# Patient Record
Sex: FEMALE | Race: WHITE | NOT HISPANIC OR LATINO | Employment: OTHER | RURAL
[De-identification: names, ages, dates, MRNs, and addresses within clinical notes are randomized per-mention and may not be internally consistent; named-entity substitution may affect disease eponyms.]

---

## 2019-12-02 ENCOUNTER — HISTORICAL (OUTPATIENT)
Dept: ADMINISTRATIVE | Facility: HOSPITAL | Age: 79
End: 2019-12-02

## 2019-12-06 LAB
LAB AP CLINICAL INFORMATION: NORMAL
LAB AP DIAGNOSIS - HISTORICAL: NORMAL
LAB AP GROSS PATHOLOGY - HISTORICAL: NORMAL
LAB AP SPECIMEN SUBMITTED - HISTORICAL: NORMAL

## 2021-12-02 ENCOUNTER — OFFICE VISIT (OUTPATIENT)
Dept: FAMILY MEDICINE | Facility: CLINIC | Age: 81
End: 2021-12-02
Payer: MEDICARE

## 2021-12-02 VITALS
TEMPERATURE: 97 F | OXYGEN SATURATION: 97 % | RESPIRATION RATE: 16 BRPM | HEIGHT: 64 IN | SYSTOLIC BLOOD PRESSURE: 147 MMHG | WEIGHT: 143.81 LBS | HEART RATE: 97 BPM | DIASTOLIC BLOOD PRESSURE: 72 MMHG | BODY MASS INDEX: 24.55 KG/M2

## 2021-12-02 DIAGNOSIS — F33.1 MODERATE EPISODE OF RECURRENT MAJOR DEPRESSIVE DISORDER: ICD-10-CM

## 2021-12-02 DIAGNOSIS — Z79.899 ENCOUNTER FOR LONG-TERM (CURRENT) USE OF OTHER MEDICATIONS: ICD-10-CM

## 2021-12-02 DIAGNOSIS — E55.9 VITAMIN D DEFICIENCY: ICD-10-CM

## 2021-12-02 DIAGNOSIS — Z95.0 PACEMAKER: ICD-10-CM

## 2021-12-02 DIAGNOSIS — E03.9 ACQUIRED HYPOTHYROIDISM: ICD-10-CM

## 2021-12-02 DIAGNOSIS — D53.9 NUTRITIONAL ANEMIA: ICD-10-CM

## 2021-12-02 DIAGNOSIS — H35.3230 BILATERAL EXUDATIVE AGE-RELATED MACULAR DEGENERATION, UNSPECIFIED STAGE: ICD-10-CM

## 2021-12-02 DIAGNOSIS — M15.9 PRIMARY OSTEOARTHRITIS INVOLVING MULTIPLE JOINTS: ICD-10-CM

## 2021-12-02 DIAGNOSIS — I10 ESSENTIAL HYPERTENSION, BENIGN: ICD-10-CM

## 2021-12-02 DIAGNOSIS — R35.1 NOCTURIA: ICD-10-CM

## 2021-12-02 DIAGNOSIS — G20.A1 PARKINSON'S DISEASE: Primary | ICD-10-CM

## 2021-12-02 PROBLEM — M15.0 PRIMARY OSTEOARTHRITIS INVOLVING MULTIPLE JOINTS: Status: ACTIVE | Noted: 2021-12-02

## 2021-12-02 LAB
25(OH)D3 SERPL-MCNC: 25.4 NG/ML
ALBUMIN SERPL BCP-MCNC: 3.8 G/DL (ref 3.5–5)
ALBUMIN/GLOB SERPL: 0.9 {RATIO}
ALP SERPL-CCNC: 65 U/L (ref 55–142)
ALT SERPL W P-5'-P-CCNC: 18 U/L (ref 13–56)
ANION GAP SERPL CALCULATED.3IONS-SCNC: 8 MMOL/L (ref 7–16)
AST SERPL W P-5'-P-CCNC: 17 U/L (ref 15–37)
BASOPHILS # BLD AUTO: 0.08 K/UL (ref 0–0.2)
BASOPHILS NFR BLD AUTO: 0.9 % (ref 0–1)
BILIRUB SERPL-MCNC: 0.3 MG/DL (ref 0–1.2)
BILIRUB UR QL STRIP: NEGATIVE
BUN SERPL-MCNC: 22 MG/DL (ref 7–18)
BUN/CREAT SERPL: 23 (ref 6–20)
CALCIUM SERPL-MCNC: 8.9 MG/DL (ref 8.5–10.1)
CHLORIDE SERPL-SCNC: 105 MMOL/L (ref 98–107)
CLARITY UR: CLEAR
CO2 SERPL-SCNC: 30 MMOL/L (ref 21–32)
COLOR UR: YELLOW
CREAT SERPL-MCNC: 0.97 MG/DL (ref 0.55–1.02)
DIFFERENTIAL METHOD BLD: ABNORMAL
EOSINOPHIL # BLD AUTO: 0.21 K/UL (ref 0–0.5)
EOSINOPHIL NFR BLD AUTO: 2.5 % (ref 1–4)
ERYTHROCYTE [DISTWIDTH] IN BLOOD BY AUTOMATED COUNT: 12.7 % (ref 11.5–14.5)
FOLATE SERPL-MCNC: >20 NG/ML (ref 3.1–17.5)
GLOBULIN SER-MCNC: 4.2 G/DL (ref 2–4)
GLUCOSE SERPL-MCNC: 84 MG/DL (ref 74–106)
GLUCOSE UR STRIP-MCNC: NEGATIVE MG/DL
HCT VFR BLD AUTO: 43.4 % (ref 38–47)
HGB BLD-MCNC: 14.2 G/DL (ref 12–16)
IMM GRANULOCYTES # BLD AUTO: 0.02 K/UL (ref 0–0.04)
IMM GRANULOCYTES NFR BLD: 0.2 % (ref 0–0.4)
KETONES UR STRIP-SCNC: NEGATIVE MG/DL
LEUKOCYTE ESTERASE UR QL STRIP: NEGATIVE
LYMPHOCYTES # BLD AUTO: 2.25 K/UL (ref 1–4.8)
LYMPHOCYTES NFR BLD AUTO: 26.3 % (ref 27–41)
MCH RBC QN AUTO: 28.7 PG (ref 27–31)
MCHC RBC AUTO-ENTMCNC: 32.7 G/DL (ref 32–36)
MCV RBC AUTO: 87.7 FL (ref 80–96)
MONOCYTES # BLD AUTO: 0.84 K/UL (ref 0–0.8)
MONOCYTES NFR BLD AUTO: 9.8 % (ref 2–6)
MPC BLD CALC-MCNC: 11 FL (ref 9.4–12.4)
NEUTROPHILS # BLD AUTO: 5.17 K/UL (ref 1.8–7.7)
NEUTROPHILS NFR BLD AUTO: 60.3 % (ref 53–65)
NITRITE UR QL STRIP: NEGATIVE
NRBC # BLD AUTO: 0 X10E3/UL
NRBC, AUTO (.00): 0 %
PH UR STRIP: 5.5 PH UNITS
PLATELET # BLD AUTO: 294 K/UL (ref 150–400)
POTASSIUM SERPL-SCNC: 4.3 MMOL/L (ref 3.5–5.1)
PROT SERPL-MCNC: 8 G/DL (ref 6.4–8.2)
PROT UR QL STRIP: NEGATIVE
RBC # BLD AUTO: 4.95 M/UL (ref 4.2–5.4)
RBC # UR STRIP: NEGATIVE /UL
SODIUM SERPL-SCNC: 139 MMOL/L (ref 136–145)
SP GR UR STRIP: 1.02
T4 FREE SERPL-MCNC: 1.29 NG/DL (ref 0.76–1.46)
TSH SERPL DL<=0.005 MIU/L-ACNC: 2.28 UIU/ML (ref 0.36–3.74)
UROBILINOGEN UR STRIP-ACNC: 0.2 MG/DL
VIT B12 SERPL-MCNC: 1738 PG/ML (ref 193–986)
WBC # BLD AUTO: 8.57 K/UL (ref 4.5–11)

## 2021-12-02 PROCEDURE — 81003 URINALYSIS: ICD-10-PCS | Mod: QW,,, | Performed by: CLINICAL MEDICAL LABORATORY

## 2021-12-02 PROCEDURE — 82746 ASSAY OF FOLIC ACID SERUM: CPT | Mod: ,,, | Performed by: CLINICAL MEDICAL LABORATORY

## 2021-12-02 PROCEDURE — 85025 COMPLETE CBC W/AUTO DIFF WBC: CPT | Mod: ,,, | Performed by: CLINICAL MEDICAL LABORATORY

## 2021-12-02 PROCEDURE — 82746 VITAMIN B12/FOLATE, SERUM PANEL: ICD-10-PCS | Mod: ,,, | Performed by: CLINICAL MEDICAL LABORATORY

## 2021-12-02 PROCEDURE — 81003 URINALYSIS AUTO W/O SCOPE: CPT | Mod: QW,,, | Performed by: CLINICAL MEDICAL LABORATORY

## 2021-12-02 PROCEDURE — 84439 T4, FREE: ICD-10-PCS | Mod: ,,, | Performed by: CLINICAL MEDICAL LABORATORY

## 2021-12-02 PROCEDURE — 99204 OFFICE O/P NEW MOD 45 MIN: CPT | Mod: ,,, | Performed by: FAMILY MEDICINE

## 2021-12-02 PROCEDURE — 85025 CBC WITH DIFFERENTIAL: ICD-10-PCS | Mod: ,,, | Performed by: CLINICAL MEDICAL LABORATORY

## 2021-12-02 PROCEDURE — 84439 ASSAY OF FREE THYROXINE: CPT | Mod: ,,, | Performed by: CLINICAL MEDICAL LABORATORY

## 2021-12-02 PROCEDURE — 80053 COMPREHENSIVE METABOLIC PANEL: ICD-10-PCS | Mod: ,,, | Performed by: CLINICAL MEDICAL LABORATORY

## 2021-12-02 PROCEDURE — 80053 COMPREHEN METABOLIC PANEL: CPT | Mod: ,,, | Performed by: CLINICAL MEDICAL LABORATORY

## 2021-12-02 PROCEDURE — 84443 ASSAY THYROID STIM HORMONE: CPT | Mod: ,,, | Performed by: CLINICAL MEDICAL LABORATORY

## 2021-12-02 PROCEDURE — 99204 PR OFFICE/OUTPT VISIT, NEW, LEVL IV, 45-59 MIN: ICD-10-PCS | Mod: ,,, | Performed by: FAMILY MEDICINE

## 2021-12-02 PROCEDURE — 82607 VITAMIN B-12: CPT | Mod: ,,, | Performed by: CLINICAL MEDICAL LABORATORY

## 2021-12-02 PROCEDURE — 82306 VITAMIN D 25 HYDROXY: CPT | Mod: ,,, | Performed by: CLINICAL MEDICAL LABORATORY

## 2021-12-02 PROCEDURE — 82607 VITAMIN B12/FOLATE, SERUM PANEL: ICD-10-PCS | Mod: ,,, | Performed by: CLINICAL MEDICAL LABORATORY

## 2021-12-02 PROCEDURE — 82306 VITAMIN D: ICD-10-PCS | Mod: ,,, | Performed by: CLINICAL MEDICAL LABORATORY

## 2021-12-02 PROCEDURE — 84443 TSH: ICD-10-PCS | Mod: ,,, | Performed by: CLINICAL MEDICAL LABORATORY

## 2021-12-02 RX ORDER — LURASIDONE HYDROCHLORIDE 20 MG/1
TABLET, FILM COATED ORAL
COMMUNITY
End: 2022-09-01

## 2021-12-02 RX ORDER — CARVEDILOL 12.5 MG/1
TABLET ORAL
COMMUNITY
Start: 2021-10-21

## 2021-12-02 RX ORDER — OXYBUTYNIN CHLORIDE 5 MG/1
5 TABLET ORAL NIGHTLY PRN
Qty: 30 TABLET | Refills: 0 | Status: SHIPPED | OUTPATIENT
Start: 2021-12-02 | End: 2022-01-21

## 2021-12-02 RX ORDER — CHOLECALCIFEROL (VITAMIN D3) 1MM UNIT/G
LIQUID (ML) MISCELLANEOUS
COMMUNITY

## 2021-12-02 RX ORDER — ROSUVASTATIN CALCIUM 5 MG/1
TABLET, COATED ORAL
COMMUNITY
Start: 2021-10-20

## 2021-12-02 RX ORDER — LISINOPRIL 5 MG/1
TABLET ORAL
COMMUNITY
Start: 2021-10-20

## 2021-12-02 RX ORDER — CARBIDOPA AND LEVODOPA 25; 100 MG/1; MG/1
TABLET ORAL
COMMUNITY
Start: 2021-07-09 | End: 2021-12-02 | Stop reason: SDUPTHER

## 2021-12-02 RX ORDER — MIRTAZAPINE 30 MG/1
TABLET, FILM COATED ORAL
COMMUNITY
Start: 2021-11-10 | End: 2022-01-05 | Stop reason: SDUPTHER

## 2021-12-02 RX ORDER — LEVOTHYROXINE SODIUM 100 UG/1
TABLET ORAL
COMMUNITY
Start: 2021-11-15 | End: 2022-02-10 | Stop reason: SDUPTHER

## 2021-12-02 RX ORDER — DIVALPROEX SODIUM 125 MG/1
TABLET, DELAYED RELEASE ORAL
COMMUNITY
Start: 2021-11-17

## 2021-12-02 RX ORDER — BIOTIN 10 MG
TABLET ORAL
COMMUNITY

## 2021-12-02 RX ORDER — FOLIC ACID 1 MG/1
TABLET ORAL
COMMUNITY
Start: 2021-11-09

## 2021-12-02 RX ORDER — DESVENLAFAXINE SUCCINATE 50 MG/1
TABLET, EXTENDED RELEASE ORAL
COMMUNITY
Start: 2021-11-22

## 2021-12-02 RX ORDER — CYANOCOBALAMIN 1000 UG/ML
INJECTION, SOLUTION INTRAMUSCULAR; SUBCUTANEOUS
COMMUNITY
Start: 2021-11-15

## 2021-12-02 RX ORDER — CLONAZEPAM 0.5 MG/1
TABLET ORAL
COMMUNITY
Start: 2021-10-14 | End: 2022-06-02 | Stop reason: SDUPTHER

## 2021-12-02 RX ORDER — CARBIDOPA AND LEVODOPA 25; 100 MG/1; MG/1
1 TABLET ORAL 3 TIMES DAILY
Qty: 90 TABLET | Refills: 0 | Status: SHIPPED | OUTPATIENT
Start: 2021-12-02 | End: 2021-12-22 | Stop reason: SDUPTHER

## 2021-12-02 RX ORDER — ASPIRIN 81 MG/1
TABLET ORAL
COMMUNITY

## 2021-12-08 ENCOUNTER — TELEPHONE (OUTPATIENT)
Dept: FAMILY MEDICINE | Facility: CLINIC | Age: 81
End: 2021-12-08
Payer: MEDICARE

## 2021-12-08 RX ORDER — TOLTERODINE 4 MG/1
4 CAPSULE, EXTENDED RELEASE ORAL DAILY
Qty: 30 CAPSULE | Refills: 0 | Status: SHIPPED | OUTPATIENT
Start: 2021-12-08 | End: 2022-01-21

## 2021-12-22 RX ORDER — CARBIDOPA AND LEVODOPA 25; 100 MG/1; MG/1
2 TABLET ORAL 3 TIMES DAILY
Qty: 180 TABLET | Refills: 0 | Status: SHIPPED | OUTPATIENT
Start: 2021-12-22 | End: 2022-01-21 | Stop reason: SDUPTHER

## 2022-01-05 RX ORDER — MIRTAZAPINE 30 MG/1
30 TABLET, FILM COATED ORAL NIGHTLY
Qty: 90 TABLET | Refills: 3 | Status: SHIPPED | OUTPATIENT
Start: 2022-01-05 | End: 2022-12-29

## 2022-01-20 ENCOUNTER — OFFICE VISIT (OUTPATIENT)
Dept: FAMILY MEDICINE | Facility: CLINIC | Age: 82
End: 2022-01-20
Payer: MEDICARE

## 2022-01-20 VITALS
OXYGEN SATURATION: 97 % | SYSTOLIC BLOOD PRESSURE: 120 MMHG | HEIGHT: 64 IN | HEART RATE: 67 BPM | WEIGHT: 143 LBS | BODY MASS INDEX: 24.41 KG/M2 | TEMPERATURE: 97 F | RESPIRATION RATE: 16 BRPM | DIASTOLIC BLOOD PRESSURE: 70 MMHG

## 2022-01-20 DIAGNOSIS — I10 ESSENTIAL HYPERTENSION, BENIGN: ICD-10-CM

## 2022-01-20 DIAGNOSIS — Z79.899 ENCOUNTER FOR LONG-TERM (CURRENT) USE OF OTHER MEDICATIONS: ICD-10-CM

## 2022-01-20 DIAGNOSIS — D53.9 NUTRITIONAL ANEMIA: ICD-10-CM

## 2022-01-20 DIAGNOSIS — R20.0 NUMBNESS AND TINGLING OF BOTH FEET: Primary | ICD-10-CM

## 2022-01-20 DIAGNOSIS — E03.9 ACQUIRED HYPOTHYROIDISM: ICD-10-CM

## 2022-01-20 DIAGNOSIS — R20.2 NUMBNESS AND TINGLING OF BOTH FEET: Primary | ICD-10-CM

## 2022-01-20 DIAGNOSIS — G20.A1 PARKINSON'S DISEASE: ICD-10-CM

## 2022-01-20 PROCEDURE — 99213 PR OFFICE/OUTPT VISIT, EST, LEVL III, 20-29 MIN: ICD-10-PCS | Mod: ,,, | Performed by: FAMILY MEDICINE

## 2022-01-20 PROCEDURE — 99213 OFFICE O/P EST LOW 20 MIN: CPT | Mod: ,,, | Performed by: FAMILY MEDICINE

## 2022-01-20 NOTE — PROGRESS NOTES
Subjective:       Patient ID: Cinthia Abraham is a 81 y.o. female.    Chief Complaint: Foot Pain (Both feet feel tingly,started 3 weeks ago.) and Medication Refill (Needs refill on her sinemet.)    3 weeks of feet numbness, can change with position.  No new meds.  Reviewed last labs.  Just saw her neurologist last Saturday, mentioned the numbness but it was not addressed.    Review of Systems   Constitutional: Negative for appetite change, chills, fatigue, fever and unexpected weight change.   Respiratory: Negative for cough and shortness of breath.    Cardiovascular: Negative for chest pain and leg swelling.   Gastrointestinal: Negative for abdominal pain.   Musculoskeletal: Negative for arthralgias.   Integumentary:  Negative for rash.   Neurological: Positive for numbness. Negative for weakness.   Psychiatric/Behavioral: The patient is not nervous/anxious.          Objective:      Physical Exam  Constitutional:       General: She is not in acute distress.     Appearance: Normal appearance.   Cardiovascular:      Rate and Rhythm: Normal rate and regular rhythm.      Pulses: Normal pulses.      Heart sounds: Normal heart sounds.      Comments: DP pulses 2+ bilat  Pulmonary:      Breath sounds: Normal breath sounds.   Abdominal:      General: Abdomen is flat.      Palpations: Abdomen is soft.   Skin:     General: Skin is warm and dry.   Neurological:      Mental Status: She is alert. Mental status is at baseline.      Comments: Reports decreased sensation to touch from toes to ankles bilat   Psychiatric:         Mood and Affect: Mood normal.         Behavior: Behavior normal.         Thought Content: Thought content normal.         Judgment: Judgment normal.         Assessment:       1. Numbness and tingling of both feet  Comprehensive Metabolic Panel    CBC Auto Differential    Hemoglobin A1C    Magnesium    Sedimentation Rate    T4, Free    TSH    Vitamin B12    CANCELED: Comprehensive Metabolic Panel    CANCELED:  CBC Auto Differential    CANCELED: Hemoglobin A1C    CANCELED: Magnesium    CANCELED: Sedimentation Rate    CANCELED: T4, Free    CANCELED: TSH    CANCELED: Vitamin B12   2. Parkinson's disease     3. Essential hypertension, benign  Comprehensive Metabolic Panel    Magnesium    CANCELED: Comprehensive Metabolic Panel    CANCELED: Magnesium   4. Acquired hypothyroidism  CBC Auto Differential    T4, Free    TSH    CANCELED: CBC Auto Differential    CANCELED: T4, Free    CANCELED: TSH   5. Encounter for long-term (current) use of other medications  Comprehensive Metabolic Panel    CBC Auto Differential    Hemoglobin A1C    Magnesium    Sedimentation Rate    Vitamin B12    CANCELED: Comprehensive Metabolic Panel    CANCELED: CBC Auto Differential    CANCELED: Hemoglobin A1C    CANCELED: Magnesium    CANCELED: Sedimentation Rate    CANCELED: Vitamin B12   6. Nutritional anemia  CBC Auto Differential    Vitamin B12    CANCELED: CBC Auto Differential    CANCELED: Vitamin B12       Plan:       PT for Parkinsons  Was going to repeat some labs but pt declined.  myrbetriq 25mg samples for her OAB  She declines NCS/EMG at this time.

## 2022-01-21 RX ORDER — CARBIDOPA AND LEVODOPA 25; 100 MG/1; MG/1
2 TABLET ORAL 3 TIMES DAILY
Qty: 540 TABLET | Refills: 3 | Status: SHIPPED | OUTPATIENT
Start: 2022-01-21 | End: 2023-01-27

## 2022-02-10 RX ORDER — LEVOTHYROXINE SODIUM 100 UG/1
100 TABLET ORAL
Qty: 90 TABLET | Refills: 3 | Status: SHIPPED | OUTPATIENT
Start: 2022-02-10 | End: 2023-02-06

## 2022-03-11 DIAGNOSIS — Z71.89 COMPLEX CARE COORDINATION: ICD-10-CM

## 2022-05-25 ENCOUNTER — TELEPHONE (OUTPATIENT)
Dept: FAMILY MEDICINE | Facility: CLINIC | Age: 82
End: 2022-05-25
Payer: MEDICARE

## 2022-06-02 ENCOUNTER — OFFICE VISIT (OUTPATIENT)
Dept: FAMILY MEDICINE | Facility: CLINIC | Age: 82
End: 2022-06-02
Payer: MEDICARE

## 2022-06-02 VITALS
HEIGHT: 64 IN | SYSTOLIC BLOOD PRESSURE: 120 MMHG | BODY MASS INDEX: 26.29 KG/M2 | OXYGEN SATURATION: 94 % | WEIGHT: 154 LBS | TEMPERATURE: 98 F | DIASTOLIC BLOOD PRESSURE: 70 MMHG | HEART RATE: 70 BPM | RESPIRATION RATE: 16 BRPM

## 2022-06-02 DIAGNOSIS — F51.01 PRIMARY INSOMNIA: ICD-10-CM

## 2022-06-02 DIAGNOSIS — F41.9 ANXIETY: Primary | ICD-10-CM

## 2022-06-02 DIAGNOSIS — Z79.899 ENCOUNTER FOR LONG-TERM (CURRENT) USE OF OTHER MEDICATIONS: ICD-10-CM

## 2022-06-02 LAB
CTP QC/QA: YES
POC (AMP) AMPHETAMINE: NEGATIVE
POC (BAR) BARBITURATES: NEGATIVE
POC (BUP) BUPRENORPHINE: NEGATIVE
POC (BZO) BENZODIAZEPINES: NEGATIVE
POC (COC) COCAINE: NEGATIVE
POC (MDMA) METHYLENEDIOXYMETHAMPHETAMINE 3,4: NEGATIVE
POC (MET) METHAMPHETAMINE: NEGATIVE
POC (MOP) OPIATES: NEGATIVE
POC (MTD) METHADONE: NEGATIVE
POC (OXY) OXYCODONE: NEGATIVE
POC (PCP) PHENCYCLIDINE: NEGATIVE
POC (TCA) TRICYCLIC ANTIDEPRESSANTS: NORMAL
POC TEMPERATURE (URINE): 92
POC THC: NEGATIVE

## 2022-06-02 PROCEDURE — 99213 PR OFFICE/OUTPT VISIT, EST, LEVL III, 20-29 MIN: ICD-10-PCS | Mod: ,,, | Performed by: FAMILY MEDICINE

## 2022-06-02 PROCEDURE — 99213 OFFICE O/P EST LOW 20 MIN: CPT | Mod: ,,, | Performed by: FAMILY MEDICINE

## 2022-06-02 PROCEDURE — 80305 DRUG TEST PRSMV DIR OPT OBS: CPT | Mod: RHCUB | Performed by: FAMILY MEDICINE

## 2022-06-02 RX ORDER — DEUTETRABENAZINE 6 MG/1
TABLET, COATED ORAL
COMMUNITY
Start: 2021-10-06

## 2022-06-02 RX ORDER — CLONAZEPAM 0.5 MG/1
0.5 TABLET ORAL NIGHTLY
Qty: 30 TABLET | Refills: 2 | Status: SHIPPED | OUTPATIENT
Start: 2022-06-02 | End: 2022-09-01 | Stop reason: SDUPTHER

## 2022-06-02 NOTE — PROGRESS NOTES
Subjective:       Patient ID: Cinthia Abraham is a 82 y.o. female.    Chief Complaint: Medication Refill    Has used klonopin for years for anxiety and for sleep.  Has Parkinson's.  Another provider was filling it but she isn't going to them anymore.  Reviewed that klonopin is considered not safe for people over 65 due to increased risk of falls.    Review of Systems   Constitutional: Negative for appetite change, chills, fatigue, fever and unexpected weight change.   Respiratory: Negative for cough and shortness of breath.    Cardiovascular: Negative for chest pain and leg swelling.   Gastrointestinal: Negative for abdominal pain.   Musculoskeletal: Negative for arthralgias.   Integumentary:  Negative for rash.   Neurological: Positive for tremors. Negative for weakness.   Psychiatric/Behavioral: Positive for sleep disturbance. The patient is nervous/anxious.          Objective:      Physical Exam  Constitutional:       General: She is not in acute distress.     Appearance: Normal appearance.   Cardiovascular:      Rate and Rhythm: Normal rate and regular rhythm.      Heart sounds: Normal heart sounds.   Pulmonary:      Breath sounds: Normal breath sounds.   Abdominal:      General: Abdomen is flat.      Palpations: Abdomen is soft.   Skin:     General: Skin is warm and dry.   Neurological:      Mental Status: She is alert. Mental status is at baseline.   Psychiatric:         Mood and Affect: Mood normal.         Behavior: Behavior normal.         Thought Content: Thought content normal.         Judgment: Judgment normal.         Assessment:       1. Anxiety     2. Encounter for long-term (current) use of other medications  POCT Urine Drug Screen Presump   3. Primary insomnia         Plan:       Blaire Smith--psych NP she still sees, Nae Licona wrote the klonopin originally.  Signed new agreement with us.  Voices understanding about the risks but says she cannot sleep without it,doesn't want to try anything  else.   and UDS reviewed, ok for refills.

## 2022-06-05 PROBLEM — F51.01 PRIMARY INSOMNIA: Status: ACTIVE | Noted: 2022-06-05

## 2022-06-05 PROBLEM — F41.9 ANXIETY: Status: ACTIVE | Noted: 2022-06-05

## 2022-06-10 DIAGNOSIS — M25.511 ACUTE PAIN OF RIGHT SHOULDER: Primary | ICD-10-CM

## 2022-06-14 ENCOUNTER — HOSPITAL ENCOUNTER (OUTPATIENT)
Dept: RADIOLOGY | Facility: HOSPITAL | Age: 82
Discharge: HOME | End: 2022-06-14
Attending: ORTHOPAEDIC SURGERY
Payer: MEDICARE

## 2022-06-14 ENCOUNTER — OFFICE VISIT (OUTPATIENT)
Dept: ORTHOPEDICS | Facility: CLINIC | Age: 82
End: 2022-06-14
Payer: MEDICARE

## 2022-06-14 DIAGNOSIS — M25.511 ACUTE PAIN OF RIGHT SHOULDER: ICD-10-CM

## 2022-06-14 DIAGNOSIS — M19.011 OSTEOARTHRITIS OF GLENOHUMERAL JOINT, RIGHT: Primary | ICD-10-CM

## 2022-06-14 PROCEDURE — 99214 OFFICE O/P EST MOD 30 MIN: CPT | Mod: 25,,, | Performed by: ORTHOPAEDIC SURGERY

## 2022-06-14 PROCEDURE — 20610 LARGE JOINT ASPIRATION/INJECTION: R GLENOHUMERAL: ICD-10-PCS | Mod: RT,,, | Performed by: ORTHOPAEDIC SURGERY

## 2022-06-14 PROCEDURE — 20610 DRAIN/INJ JOINT/BURSA W/O US: CPT | Mod: RT,,, | Performed by: ORTHOPAEDIC SURGERY

## 2022-06-14 PROCEDURE — 73030 X-RAY EXAM OF SHOULDER: CPT | Mod: TC,RT

## 2022-06-14 PROCEDURE — 73030 XR SHOULDER COMPLETE 2 OR MORE VIEWS RIGHT: ICD-10-PCS | Mod: 26,RT,, | Performed by: ORTHOPAEDIC SURGERY

## 2022-06-14 PROCEDURE — 99214 PR OFFICE/OUTPT VISIT, EST, LEVL IV, 30-39 MIN: ICD-10-PCS | Mod: 25,,, | Performed by: ORTHOPAEDIC SURGERY

## 2022-06-14 PROCEDURE — 73030 X-RAY EXAM OF SHOULDER: CPT | Mod: 26,RT,, | Performed by: ORTHOPAEDIC SURGERY

## 2022-06-14 RX ADMIN — TRIAMCINOLONE ACETONIDE 40 MG: 40 INJECTION, SUSPENSION INTRA-ARTICULAR; INTRAMUSCULAR at 01:06

## 2022-06-14 RX ADMIN — BUPIVACAINE HYDROCHLORIDE 5 ML: 5 INJECTION, SOLUTION PERINEURAL at 01:06

## 2022-06-14 NOTE — PROGRESS NOTES
HPI:   Cinthia Abraham is a pleasant 82 y.o. patient who reports to clinic for evaluation of right shoulder pain. She has had pain for about 1 year. Denies any injury. It is painful when she lifts the arm.   Injury onset and description: none  Patient's occupation: retired  This is not a work related injury.   she has not had formal physical therapy  she has not had previous shoulder injections.   she has not had advanced imaging such as MRI.   The shoulder pain worsens with activity and overhead motion. Pain is disruptive to sleep at night. The pain is better with rest. Treatment thus far has included rest and activity modification. Here today to discuss diagnosis and treatment options.   VAS Pain Scale:  5  SANE Score:     PAST MEDICAL HISTORY:   No past medical history on file.  PAST SURGICAL HISTORY:   No past surgical history on file.  MEDICATIONS:    Current Outpatient Medications:     aspirin (ECOTRIN) 81 MG EC tablet, 1 tablet, Disp: , Rfl:     biotin 10 mg Tab, 1 tablet, Disp: , Rfl:     C,E,zinc,copper 11-omega3s-lut (OCUVITE ADULT 50 PLUS) 250-5-1 mg Cap, , Disp: , Rfl:     carbidopa-levodopa  mg (SINEMET)  mg per tablet, Take 2 tablets by mouth 3 (three) times daily., Disp: 540 tablet, Rfl: 3    carvediloL (COREG) 12.5 MG tablet, , Disp: , Rfl:     cholecalciferol, vitamin D3, (CHOLECALCIFEROL, VIT D3,,BULK,) 1 million unit/gram Liqd, as directed, Disp: , Rfl:     clonazePAM (KLONOPIN) 0.5 MG tablet, Take 1 tablet (0.5 mg total) by mouth every evening., Disp: 30 tablet, Rfl: 2    cyanocobalamin 1,000 mcg/mL injection, , Disp: , Rfl:     desvenlafaxine succinate (PRISTIQ) 50 MG Tb24, , Disp: , Rfl:     deutetrabenazine (AUSTEDO) 6 mg Tab, , Disp: , Rfl:     divalproex (DEPAKOTE) 125 MG EC tablet, , Disp: , Rfl:     folic acid (FOLVITE) 1 MG tablet, , Disp: , Rfl:     levothyroxine (SYNTHROID) 100 MCG tablet, Take 1 tablet (100 mcg total) by mouth before breakfast., Disp: 90  tablet, Rfl: 3    lisinopriL (PRINIVIL,ZESTRIL) 5 MG tablet, , Disp: , Rfl:     lurasidone (LATUDA) 20 mg Tab tablet, 2 tablets with food, Disp: , Rfl:     mirtazapine (REMERON) 30 MG tablet, Take 1 tablet (30 mg total) by mouth every evening., Disp: 90 tablet, Rfl: 3    rosuvastatin (CRESTOR) 5 MG tablet, , Disp: , Rfl:   ALLERGIES:   Review of patient's allergies indicates:  No Known Allergies  REVIEW OF SYSTEMS:  Constitution: Negative. Negative for chills, fever and night sweats. HENT: Negative for congestion and headaches.  Eyes: Negative for blurred vision, left vision loss and right vision loss. Cardiovascular: Negative for chest pain and syncope. Respiratory: Negative for cough and shortness of breath.  Endocrine: Negative for polydipsia, polyphagia and polyuria. Hematologic/Lymphatic: Negative for bleeding problem. Does not bruise/bleed easily. Skin: Negative for dry skin, itching and rash.   Musculoskeletal: Negative for falls. Positive for hand pain and muscle weakness.     PHYSICAL EXAM:  VITAL SIGNS: There were no vitals taken for this visit.  General: Well-developed well-nourished 82 y.o. femalein no acute distress;Cardiovascular: Regular rhythm by palpation of distal pulse, normal color and temperature, no concerning varicosities on symptomatic side Lungs: No labored breathing or wheezing appreciated Neuro: Alert and oriented ×3 Psychiatric: well oriented to person, place and time, demonstrates normal mood and affect Skin: No rashes, lesions or ulcers, normal temperature, turgor, and texture on uninvolved extremity    General    Nursing note and vitals reviewed.  Constitutional: She is oriented to person, place, and time. She appears well-developed and well-nourished. No distress.   HENT:   Head: Normocephalic and atraumatic.   Neck: Neck supple.   Cardiovascular: Normal rate, regular rhythm and intact distal pulses.    Pulmonary/Chest: Effort normal. No respiratory distress. She exhibits no  tenderness.   Abdominal: Soft. She exhibits no distension. There is no abdominal tenderness.   Neurological: She is alert and oriented to person, place, and time. She has normal reflexes. She displays normal reflexes. No cranial nerve deficit. She exhibits normal muscle tone.   Psychiatric: She has a normal mood and affect. Her behavior is normal. Judgment and thought content normal.         Right Shoulder Exam     Inspection/Observation   Swelling: present  Scapular Dyskinesia: positive    Tenderness   The patient is tender to palpation of the acromioclavicular joint, supraspinatus, greater tuberosity and biceps tendon.    Crepitus   The patient has crepitus of the AC joint and greater tuberosity.    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      Muscle Strength   Right Upper Extremity   Supraspinatus: 3/5   Subscapularis: 3/5   Biceps: 4/5     Reflexes     Right Side   Right Bills's Sign:  absent    Vascular Exam     Right Pulses      Radial:                    2+            IMAGING:  X-ray Shoulder 2 or More Views Right    Result Date: 6/14/2022  See Procedure Notes for results. IMPRESSION: Please see Ortho procedure notes for report.  This procedure was auto-finalized by: Virtual Radiologist  X-rays of the right shoulder were taken today. Severe glenohumeral osteoarthritis is seen. There is evidence of severe  acromioclavicular joint osteoarthritis.  Subtle Type II acromion is noted.         ASSESSMENT:      ICD-10-CM ICD-9-CM   1. Osteoarthritis of glenohumeral joint, right  M19.011 715.91       PLAN:     -Findings and treatment options were discussed with the patient  -All questions answered  We discussed total shoulder vs reverse arthroplasty as well as conservative measures.  Wishes to pursue non-op treatment if possible.  An injection was given today.  Will follow up PRN.     There are no Patient Instructions on file for this visit.  No orders of the defined types were placed in this encounter.    Large Joint Aspiration/Injection: R glenohumeral    Date/Time: 6/14/2022 1:30 PM  Performed by: Jose Gilbert MD  Authorized by: Jose Gilbert MD     Consent Done?:  Yes (Verbal)  Indications:  Pain  Timeout: prior to procedure the correct patient, procedure, and site was verified      Local anesthesia used?: Yes      Details:  Needle Size:  22 G  Approach:  Anterior  Location:  Shoulder  Site:  R glenohumeral  Medications:  5 mL BUPivacaine 0.5 % (5 mg/mL); 40 mg triamcinolone acetonide 40 mg/mL  Patient tolerance:  Patient tolerated the procedure well with no immediate complications

## 2022-06-16 RX ORDER — BUPIVACAINE HYDROCHLORIDE 5 MG/ML
5 INJECTION, SOLUTION PERINEURAL
Status: DISCONTINUED | OUTPATIENT
Start: 2022-06-14 | End: 2022-06-16 | Stop reason: HOSPADM

## 2022-06-16 RX ORDER — TRIAMCINOLONE ACETONIDE 40 MG/ML
40 INJECTION, SUSPENSION INTRA-ARTICULAR; INTRAMUSCULAR
Status: DISCONTINUED | OUTPATIENT
Start: 2022-06-14 | End: 2022-06-16 | Stop reason: HOSPADM

## 2022-09-01 ENCOUNTER — OFFICE VISIT (OUTPATIENT)
Dept: FAMILY MEDICINE | Facility: CLINIC | Age: 82
End: 2022-09-01
Payer: MEDICARE

## 2022-09-01 VITALS
HEIGHT: 64 IN | DIASTOLIC BLOOD PRESSURE: 60 MMHG | OXYGEN SATURATION: 95 % | WEIGHT: 157 LBS | TEMPERATURE: 98 F | HEART RATE: 74 BPM | SYSTOLIC BLOOD PRESSURE: 110 MMHG | BODY MASS INDEX: 26.8 KG/M2 | RESPIRATION RATE: 16 BRPM

## 2022-09-01 DIAGNOSIS — Z51.81 ENCOUNTER FOR MEDICATION MONITORING: ICD-10-CM

## 2022-09-01 DIAGNOSIS — F51.01 PRIMARY INSOMNIA: Primary | ICD-10-CM

## 2022-09-01 DIAGNOSIS — G20.A1 PARKINSON'S DISEASE: ICD-10-CM

## 2022-09-01 DIAGNOSIS — F33.1 MODERATE EPISODE OF RECURRENT MAJOR DEPRESSIVE DISORDER: ICD-10-CM

## 2022-09-01 DIAGNOSIS — F41.9 ANXIETY: ICD-10-CM

## 2022-09-01 DIAGNOSIS — E03.9 ACQUIRED HYPOTHYROIDISM: ICD-10-CM

## 2022-09-01 DIAGNOSIS — I10 ESSENTIAL HYPERTENSION, BENIGN: ICD-10-CM

## 2022-09-01 DIAGNOSIS — Z79.899 ENCOUNTER FOR LONG-TERM (CURRENT) USE OF OTHER MEDICATIONS: ICD-10-CM

## 2022-09-01 LAB
ALBUMIN SERPL BCP-MCNC: 4 G/DL (ref 3.5–5)
ALBUMIN/GLOB SERPL: 1.2 {RATIO}
ALP SERPL-CCNC: 82 U/L (ref 55–142)
ALT SERPL W P-5'-P-CCNC: 9 U/L (ref 13–56)
ANION GAP SERPL CALCULATED.3IONS-SCNC: 10 MMOL/L (ref 7–16)
AST SERPL W P-5'-P-CCNC: 16 U/L (ref 15–37)
BASOPHILS # BLD AUTO: 0.07 K/UL (ref 0–0.2)
BASOPHILS NFR BLD AUTO: 0.8 % (ref 0–1)
BILIRUB SERPL-MCNC: 0.3 MG/DL (ref ?–1.2)
BUN SERPL-MCNC: 18 MG/DL (ref 7–18)
BUN/CREAT SERPL: 22 (ref 6–20)
CALCIUM SERPL-MCNC: 9 MG/DL (ref 8.5–10.1)
CHLORIDE SERPL-SCNC: 105 MMOL/L (ref 98–107)
CHOLEST SERPL-MCNC: 165 MG/DL (ref 0–200)
CHOLEST/HDLC SERPL: 2.3 {RATIO}
CO2 SERPL-SCNC: 28 MMOL/L (ref 21–32)
CREAT SERPL-MCNC: 0.82 MG/DL (ref 0.55–1.02)
CTP QC/QA: YES
DIFFERENTIAL METHOD BLD: ABNORMAL
EGFR (NO RACE VARIABLE) (RUSH/TITUS): 72 ML/MIN/1.73M²
EOSINOPHIL # BLD AUTO: 0.14 K/UL (ref 0–0.5)
EOSINOPHIL NFR BLD AUTO: 1.6 % (ref 1–4)
ERYTHROCYTE [DISTWIDTH] IN BLOOD BY AUTOMATED COUNT: 12.7 % (ref 11.5–14.5)
GLOBULIN SER-MCNC: 3.4 G/DL (ref 2–4)
GLUCOSE SERPL-MCNC: 84 MG/DL (ref 74–106)
HCT VFR BLD AUTO: 41.3 % (ref 38–47)
HDLC SERPL-MCNC: 72 MG/DL (ref 40–60)
HGB BLD-MCNC: 14 G/DL (ref 12–16)
IMM GRANULOCYTES # BLD AUTO: 0.02 K/UL (ref 0–0.04)
IMM GRANULOCYTES NFR BLD: 0.2 % (ref 0–0.4)
LDLC SERPL CALC-MCNC: 68 MG/DL
LDLC/HDLC SERPL: 0.9 {RATIO}
LYMPHOCYTES # BLD AUTO: 2.05 K/UL (ref 1–4.8)
LYMPHOCYTES NFR BLD AUTO: 24 % (ref 27–41)
MCH RBC QN AUTO: 29.5 PG (ref 27–31)
MCHC RBC AUTO-ENTMCNC: 33.9 G/DL (ref 32–36)
MCV RBC AUTO: 87.1 FL (ref 80–96)
MONOCYTES # BLD AUTO: 0.59 K/UL (ref 0–0.8)
MONOCYTES NFR BLD AUTO: 6.9 % (ref 2–6)
MPC BLD CALC-MCNC: 11.6 FL (ref 9.4–12.4)
NEUTROPHILS # BLD AUTO: 5.67 K/UL (ref 1.8–7.7)
NEUTROPHILS NFR BLD AUTO: 66.5 % (ref 53–65)
NONHDLC SERPL-MCNC: 93 MG/DL
NRBC # BLD AUTO: 0 X10E3/UL
NRBC, AUTO (.00): 0 %
PLATELET # BLD AUTO: 275 K/UL (ref 150–400)
POC (AMP) AMPHETAMINE: NEGATIVE
POC (BAR) BARBITURATES: NEGATIVE
POC (BUP) BUPRENORPHINE: NEGATIVE
POC (BZO) BENZODIAZEPINES: NEGATIVE
POC (COC) COCAINE: NEGATIVE
POC (MDMA) METHYLENEDIOXYMETHAMPHETAMINE 3,4: NEGATIVE
POC (MET) METHAMPHETAMINE: NEGATIVE
POC (MOP) OPIATES: NEGATIVE
POC (MTD) METHADONE: NEGATIVE
POC (OXY) OXYCODONE: NEGATIVE
POC (PCP) PHENCYCLIDINE: NEGATIVE
POC (TCA) TRICYCLIC ANTIDEPRESSANTS: NORMAL
POC TEMPERATURE (URINE): 92
POC THC: NEGATIVE
POTASSIUM SERPL-SCNC: 4.1 MMOL/L (ref 3.5–5.1)
PROT SERPL-MCNC: 7.4 G/DL (ref 6.4–8.2)
RBC # BLD AUTO: 4.74 M/UL (ref 4.2–5.4)
SODIUM SERPL-SCNC: 139 MMOL/L (ref 136–145)
T4 FREE SERPL-MCNC: 1.27 NG/DL (ref 0.76–1.46)
TRIGL SERPL-MCNC: 125 MG/DL (ref 35–150)
TSH SERPL DL<=0.005 MIU/L-ACNC: 1.24 UIU/ML (ref 0.36–3.74)
VALPROATE SERPL-MCNC: 22 ΜG/ML (ref 50–100)
VLDLC SERPL-MCNC: 25 MG/DL
WBC # BLD AUTO: 8.54 K/UL (ref 4.5–11)

## 2022-09-01 PROCEDURE — 85025 CBC WITH DIFFERENTIAL: ICD-10-PCS | Mod: ,,, | Performed by: CLINICAL MEDICAL LABORATORY

## 2022-09-01 PROCEDURE — 80305 DRUG TEST PRSMV DIR OPT OBS: CPT | Mod: RHCUB | Performed by: FAMILY MEDICINE

## 2022-09-01 PROCEDURE — 80053 COMPREHEN METABOLIC PANEL: CPT | Mod: ,,, | Performed by: CLINICAL MEDICAL LABORATORY

## 2022-09-01 PROCEDURE — 99214 PR OFFICE/OUTPT VISIT, EST, LEVL IV, 30-39 MIN: ICD-10-PCS | Mod: ,,, | Performed by: FAMILY MEDICINE

## 2022-09-01 PROCEDURE — 85025 COMPLETE CBC W/AUTO DIFF WBC: CPT | Mod: ,,, | Performed by: CLINICAL MEDICAL LABORATORY

## 2022-09-01 PROCEDURE — 84443 ASSAY THYROID STIM HORMONE: CPT | Mod: ,,, | Performed by: CLINICAL MEDICAL LABORATORY

## 2022-09-01 PROCEDURE — 84439 T4, FREE: ICD-10-PCS | Mod: ,,, | Performed by: CLINICAL MEDICAL LABORATORY

## 2022-09-01 PROCEDURE — 80053 COMPREHENSIVE METABOLIC PANEL: ICD-10-PCS | Mod: ,,, | Performed by: CLINICAL MEDICAL LABORATORY

## 2022-09-01 PROCEDURE — 80164 VALPROIC ACID: ICD-10-PCS | Mod: ,,, | Performed by: CLINICAL MEDICAL LABORATORY

## 2022-09-01 PROCEDURE — 84439 ASSAY OF FREE THYROXINE: CPT | Mod: ,,, | Performed by: CLINICAL MEDICAL LABORATORY

## 2022-09-01 PROCEDURE — 80061 LIPID PANEL: ICD-10-PCS | Mod: ,,, | Performed by: CLINICAL MEDICAL LABORATORY

## 2022-09-01 PROCEDURE — 80061 LIPID PANEL: CPT | Mod: ,,, | Performed by: CLINICAL MEDICAL LABORATORY

## 2022-09-01 PROCEDURE — 84443 TSH: ICD-10-PCS | Mod: ,,, | Performed by: CLINICAL MEDICAL LABORATORY

## 2022-09-01 PROCEDURE — 80164 ASSAY DIPROPYLACETIC ACD TOT: CPT | Mod: ,,, | Performed by: CLINICAL MEDICAL LABORATORY

## 2022-09-01 PROCEDURE — 99214 OFFICE O/P EST MOD 30 MIN: CPT | Mod: ,,, | Performed by: FAMILY MEDICINE

## 2022-09-01 RX ORDER — LURASIDONE HYDROCHLORIDE 40 MG/1
40 TABLET, FILM COATED ORAL DAILY
COMMUNITY

## 2022-09-01 RX ORDER — CLONAZEPAM 0.5 MG/1
0.5 TABLET ORAL NIGHTLY
Qty: 30 TABLET | Refills: 2 | Status: SHIPPED | OUTPATIENT
Start: 2022-09-01 | End: 2022-11-16 | Stop reason: SDUPTHER

## 2022-09-01 RX ORDER — GABAPENTIN 300 MG/1
300 CAPSULE ORAL 3 TIMES DAILY
COMMUNITY

## 2022-09-01 NOTE — PROGRESS NOTES
Subjective:       Patient ID: Cinthia Abraham is a 82 y.o. female.    Chief Complaint: Medication Refill and Follow-up (3 month)    3 mo f/u for klonopin.  Also, her psychiatrist has some lab orders she would like for us to draw and fax to her/him.  Review of Systems   Constitutional:  Negative for appetite change, chills, fatigue, fever and unexpected weight change.   Respiratory:  Negative for cough and shortness of breath.    Cardiovascular:  Negative for chest pain and leg swelling.   Gastrointestinal:  Negative for abdominal pain.   Musculoskeletal:  Negative for arthralgias.   Integumentary:  Negative for rash.   Neurological:  Positive for tremors, coordination difficulties and coordination difficulties. Negative for weakness.   Psychiatric/Behavioral:  Positive for dysphoric mood. The patient is nervous/anxious.        Objective:      Physical Exam  Constitutional:       General: She is not in acute distress.     Appearance: Normal appearance.   Cardiovascular:      Rate and Rhythm: Normal rate and regular rhythm.      Heart sounds: Normal heart sounds.   Pulmonary:      Breath sounds: Normal breath sounds.   Abdominal:      General: Abdomen is flat.      Palpations: Abdomen is soft.   Skin:     General: Skin is warm and dry.   Neurological:      Mental Status: She is alert. Mental status is at baseline.   Psychiatric:         Mood and Affect: Mood normal.         Behavior: Behavior normal.         Thought Content: Thought content normal.         Judgment: Judgment normal.       Assessment:       1. Primary insomnia        2. Encounter for long-term (current) use of other medications  CBC Auto Differential    Comprehensive Metabolic Panel    Lipid Panel    Valproic Acid    POCT Urine Drug Screen Presump    CBC Auto Differential    Comprehensive Metabolic Panel    Lipid Panel    Valproic Acid      3. Parkinson's disease  CBC Auto Differential    Comprehensive Metabolic Panel    CBC Auto Differential     Comprehensive Metabolic Panel      4. Essential hypertension, benign  CBC Auto Differential    Comprehensive Metabolic Panel    Lipid Panel    CBC Auto Differential    Comprehensive Metabolic Panel    Lipid Panel      5. Acquired hypothyroidism  CBC Auto Differential    Lipid Panel    T4, Free    TSH    CBC Auto Differential    Lipid Panel    T4, Free    TSH      6. Moderate episode of recurrent major depressive disorder  Valproic Acid    Valproic Acid      7. Encounter for medication monitoring        8. Anxiety            Plan:          and UDS reviewed, ok for refills.  Labs for her psychiatrist.

## 2022-10-09 DIAGNOSIS — Z71.89 COMPLEX CARE COORDINATION: ICD-10-CM

## 2022-10-19 ENCOUNTER — TELEPHONE (OUTPATIENT)
Dept: FAMILY MEDICINE | Facility: CLINIC | Age: 82
End: 2022-10-19
Payer: MEDICARE

## 2022-10-19 RX ORDER — IBUPROFEN 600 MG/1
600 TABLET ORAL EVERY 6 HOURS PRN
Qty: 40 TABLET | Refills: 3 | Status: SHIPPED | OUTPATIENT
Start: 2022-10-19

## 2022-11-16 ENCOUNTER — OFFICE VISIT (OUTPATIENT)
Dept: FAMILY MEDICINE | Facility: CLINIC | Age: 82
End: 2022-11-16
Payer: MEDICARE

## 2022-11-16 VITALS
WEIGHT: 157 LBS | RESPIRATION RATE: 16 BRPM | TEMPERATURE: 98 F | OXYGEN SATURATION: 95 % | BODY MASS INDEX: 26.8 KG/M2 | SYSTOLIC BLOOD PRESSURE: 103 MMHG | DIASTOLIC BLOOD PRESSURE: 62 MMHG | HEIGHT: 64 IN | HEART RATE: 72 BPM

## 2022-11-16 DIAGNOSIS — Z79.899 ENCOUNTER FOR LONG-TERM (CURRENT) USE OF OTHER MEDICATIONS: ICD-10-CM

## 2022-11-16 DIAGNOSIS — F41.9 ANXIETY: Primary | ICD-10-CM

## 2022-11-16 DIAGNOSIS — F33.1 MODERATE EPISODE OF RECURRENT MAJOR DEPRESSIVE DISORDER: ICD-10-CM

## 2022-11-16 DIAGNOSIS — G20.A1 PARKINSON'S DISEASE: ICD-10-CM

## 2022-11-16 PROCEDURE — 99213 OFFICE O/P EST LOW 20 MIN: CPT | Mod: ,,, | Performed by: FAMILY MEDICINE

## 2022-11-16 PROCEDURE — 99213 PR OFFICE/OUTPT VISIT, EST, LEVL III, 20-29 MIN: ICD-10-PCS | Mod: ,,, | Performed by: FAMILY MEDICINE

## 2022-11-16 RX ORDER — CLONAZEPAM 0.5 MG/1
0.5 TABLET ORAL 2 TIMES DAILY PRN
Qty: 60 TABLET | Refills: 0 | Status: SHIPPED | OUTPATIENT
Start: 2022-11-16 | End: 2022-12-15 | Stop reason: SDUPTHER

## 2022-11-16 RX ORDER — CLONAZEPAM 0.5 MG/1
0.5 TABLET ORAL 2 TIMES DAILY PRN
Qty: 60 TABLET | Refills: 2 | Status: SHIPPED | OUTPATIENT
Start: 2022-11-16 | End: 2022-11-16 | Stop reason: SDUPTHER

## 2022-11-16 NOTE — PROGRESS NOTES
Subjective:       Patient ID: Cinthia Abraham is a 82 y.o. female.    Chief Complaint: Medication Refill and Follow-up (3 month)    3 mo f/u for klonopin refill.  She is asking to increase it, sometimes has anxiety during the day.  She has not discussed with her psych NP yet.    Review of Systems   Constitutional:  Negative for appetite change, chills, fatigue, fever and unexpected weight change.   Respiratory:  Negative for cough and shortness of breath.    Cardiovascular:  Negative for chest pain and leg swelling.   Gastrointestinal:  Negative for abdominal pain.   Musculoskeletal:  Negative for arthralgias.   Integumentary:  Negative for rash.   Neurological:  Negative for weakness.   Psychiatric/Behavioral:  The patient is not nervous/anxious.        Objective:      Physical Exam  Constitutional:       General: She is not in acute distress.     Appearance: Normal appearance.   Cardiovascular:      Rate and Rhythm: Normal rate and regular rhythm.      Heart sounds: Normal heart sounds.   Pulmonary:      Breath sounds: Normal breath sounds.   Abdominal:      General: Abdomen is flat.      Palpations: Abdomen is soft.   Skin:     General: Skin is warm and dry.   Neurological:      Mental Status: She is alert. Mental status is at baseline.   Psychiatric:         Mood and Affect: Mood normal.         Behavior: Behavior normal.         Thought Content: Thought content normal.         Judgment: Judgment normal.       Assessment:       1. Anxiety        2. Encounter for long-term (current) use of other medications  CANCELED: POCT Urine Drug Screen Presump      3. Moderate episode of recurrent major depressive disorder        4. Parkinson's disease            Plan:       I called her psych NP and discussed the klonopin.  She thought it would ok for her to be able to take a daytime dose if needed for anxiety.    Her Parkinson's is causing quite a disability now, she had to have assistance for the UDS, and even then,  missed the cup so we let her skip the UDS today.   reviewed.  Wrote rx for 1 mo supply.  She can make appt in one month to come back and try again for the UDS and see if still needs BID dosing of klonopin.

## 2022-12-15 ENCOUNTER — OFFICE VISIT (OUTPATIENT)
Dept: FAMILY MEDICINE | Facility: CLINIC | Age: 82
End: 2022-12-15
Payer: MEDICARE

## 2022-12-15 VITALS
TEMPERATURE: 98 F | SYSTOLIC BLOOD PRESSURE: 118 MMHG | RESPIRATION RATE: 16 BRPM | OXYGEN SATURATION: 97 % | DIASTOLIC BLOOD PRESSURE: 70 MMHG | BODY MASS INDEX: 27.31 KG/M2 | HEART RATE: 71 BPM | WEIGHT: 160 LBS | HEIGHT: 64 IN

## 2022-12-15 DIAGNOSIS — Z79.899 ENCOUNTER FOR LONG-TERM (CURRENT) USE OF OTHER MEDICATIONS: ICD-10-CM

## 2022-12-15 DIAGNOSIS — G20.A1 PARKINSON'S DISEASE: ICD-10-CM

## 2022-12-15 DIAGNOSIS — F51.01 PRIMARY INSOMNIA: ICD-10-CM

## 2022-12-15 DIAGNOSIS — F41.9 ANXIETY: Primary | ICD-10-CM

## 2022-12-15 LAB
CTP QC/QA: YES
POC (AMP) AMPHETAMINE: NEGATIVE
POC (BAR) BARBITURATES: NEGATIVE
POC (BUP) BUPRENORPHINE: NEGATIVE
POC (BZO) BENZODIAZEPINES: NEGATIVE
POC (COC) COCAINE: NEGATIVE
POC (MDMA) METHYLENEDIOXYMETHAMPHETAMINE 3,4: NEGATIVE
POC (MET) METHAMPHETAMINE: NEGATIVE
POC (MOP) OPIATES: NEGATIVE
POC (MTD) METHADONE: NEGATIVE
POC (OXY) OXYCODONE: NEGATIVE
POC (PCP) PHENCYCLIDINE: NEGATIVE
POC (TCA) TRICYCLIC ANTIDEPRESSANTS: NORMAL
POC TEMPERATURE (URINE): 90
POC THC: NEGATIVE

## 2022-12-15 PROCEDURE — 99213 OFFICE O/P EST LOW 20 MIN: CPT | Mod: ,,, | Performed by: FAMILY MEDICINE

## 2022-12-15 PROCEDURE — 99213 PR OFFICE/OUTPT VISIT, EST, LEVL III, 20-29 MIN: ICD-10-PCS | Mod: ,,, | Performed by: FAMILY MEDICINE

## 2022-12-15 PROCEDURE — 80305 DRUG TEST PRSMV DIR OPT OBS: CPT | Mod: RHCUB | Performed by: FAMILY MEDICINE

## 2022-12-15 RX ORDER — CLONAZEPAM 0.5 MG/1
0.5 TABLET ORAL 2 TIMES DAILY PRN
Qty: 60 TABLET | Refills: 2 | Status: SHIPPED | OUTPATIENT
Start: 2022-12-15 | End: 2023-03-15 | Stop reason: SDUPTHER

## 2022-12-16 NOTE — PROGRESS NOTES
Subjective:       Patient ID: Cinthia Abraham is a 82 y.o. female.    Chief Complaint: Follow-up and Medication Refill (1 month)    Here for UDS and to review change in klonopin:  going up to BID was helpful.    Review of Systems   Constitutional:  Negative for appetite change, chills, fatigue, fever and unexpected weight change.   Respiratory:  Negative for cough and shortness of breath.    Cardiovascular:  Negative for chest pain and leg swelling.   Gastrointestinal:  Negative for abdominal pain.   Musculoskeletal:  Negative for arthralgias.   Integumentary:  Negative for rash.   Neurological:  Negative for weakness.   Psychiatric/Behavioral:  The patient is nervous/anxious.        Objective:      Physical Exam  Constitutional:       General: She is not in acute distress.     Appearance: Normal appearance.   Cardiovascular:      Rate and Rhythm: Normal rate and regular rhythm.      Heart sounds: Normal heart sounds.   Pulmonary:      Breath sounds: Normal breath sounds.   Abdominal:      General: Abdomen is flat.      Palpations: Abdomen is soft.   Skin:     General: Skin is warm and dry.   Neurological:      Mental Status: She is alert. Mental status is at baseline.   Psychiatric:         Mood and Affect: Mood normal.         Behavior: Behavior normal.         Thought Content: Thought content normal.         Judgment: Judgment normal.       Assessment:       1. Anxiety        2. Encounter for long-term (current) use of other medications  POCT Urine Drug Screen Presump      3. Primary insomnia        4. Parkinson's disease               Plan:        and UDS reviewed, ok for refills.

## 2022-12-19 ENCOUNTER — TELEPHONE (OUTPATIENT)
Dept: FAMILY MEDICINE | Facility: CLINIC | Age: 82
End: 2022-12-19
Payer: MEDICARE

## 2022-12-19 DIAGNOSIS — N39.0 URINARY TRACT INFECTION WITHOUT HEMATURIA, SITE UNSPECIFIED: Primary | ICD-10-CM

## 2022-12-19 RX ORDER — NITROFURANTOIN 25; 75 MG/1; MG/1
100 CAPSULE ORAL 2 TIMES DAILY
Qty: 20 CAPSULE | Refills: 0 | Status: SHIPPED | OUTPATIENT
Start: 2022-12-19 | End: 2022-12-29

## 2022-12-19 NOTE — TELEPHONE ENCOUNTER
Pt called c/o suffering with UTI symptons all weekend and wanted to get an antibiotic called into [facility].

## 2023-01-19 DIAGNOSIS — G20.A1 PARKINSON'S DISEASE: Primary | ICD-10-CM

## 2023-01-19 DIAGNOSIS — R20.2 NUMBNESS AND TINGLING OF BOTH FEET: ICD-10-CM

## 2023-01-19 DIAGNOSIS — R20.0 NUMBNESS AND TINGLING OF BOTH FEET: ICD-10-CM

## 2023-01-19 DIAGNOSIS — M15.9 PRIMARY OSTEOARTHRITIS INVOLVING MULTIPLE JOINTS: ICD-10-CM

## 2023-03-15 ENCOUNTER — OFFICE VISIT (OUTPATIENT)
Dept: FAMILY MEDICINE | Facility: CLINIC | Age: 83
End: 2023-03-15
Payer: MEDICARE

## 2023-03-15 VITALS
HEART RATE: 100 BPM | OXYGEN SATURATION: 99 % | HEIGHT: 64 IN | TEMPERATURE: 98 F | SYSTOLIC BLOOD PRESSURE: 118 MMHG | RESPIRATION RATE: 16 BRPM | DIASTOLIC BLOOD PRESSURE: 70 MMHG | WEIGHT: 162 LBS | BODY MASS INDEX: 27.66 KG/M2

## 2023-03-15 DIAGNOSIS — F41.9 ANXIETY: Primary | ICD-10-CM

## 2023-03-15 DIAGNOSIS — G20.A1 PARKINSON'S DISEASE: ICD-10-CM

## 2023-03-15 DIAGNOSIS — Z79.899 ENCOUNTER FOR LONG-TERM (CURRENT) USE OF OTHER MEDICATIONS: ICD-10-CM

## 2023-03-15 PROCEDURE — 99213 PR OFFICE/OUTPT VISIT, EST, LEVL III, 20-29 MIN: ICD-10-PCS | Mod: ,,, | Performed by: FAMILY MEDICINE

## 2023-03-15 PROCEDURE — 99213 OFFICE O/P EST LOW 20 MIN: CPT | Mod: ,,, | Performed by: FAMILY MEDICINE

## 2023-03-15 RX ORDER — CLONAZEPAM 0.5 MG/1
0.5 TABLET ORAL 2 TIMES DAILY PRN
Qty: 60 TABLET | Refills: 0 | Status: SHIPPED | OUTPATIENT
Start: 2023-03-15

## 2023-03-17 NOTE — PROGRESS NOTES
Subjective:       Patient ID: Cinthia Abraham is a 83 y.o. female.    Chief Complaint: Follow-up and Medication Refill (3 month)    3 mo f/u for med refill.  No new complaints.    Review of Systems   Constitutional:  Negative for appetite change, chills, fatigue, fever and unexpected weight change.   Respiratory:  Negative for cough and shortness of breath.    Cardiovascular:  Negative for chest pain and leg swelling.   Gastrointestinal:  Negative for abdominal pain.   Musculoskeletal:  Negative for arthralgias.   Integumentary:  Negative for rash.   Neurological:  Negative for weakness.   Psychiatric/Behavioral:  The patient is not nervous/anxious.        Objective:      Physical Exam  Constitutional:       General: She is not in acute distress.     Appearance: Normal appearance.   Cardiovascular:      Rate and Rhythm: Normal rate and regular rhythm.      Heart sounds: Normal heart sounds.   Pulmonary:      Breath sounds: Normal breath sounds.   Abdominal:      General: Abdomen is flat.      Palpations: Abdomen is soft.   Skin:     General: Skin is warm and dry.   Neurological:      Mental Status: She is alert. Mental status is at baseline.   Psychiatric:         Mood and Affect: Mood normal.         Behavior: Behavior normal.         Thought Content: Thought content normal.         Judgment: Judgment normal.       Assessment:      1. Anxiety        2. Encounter for long-term (current) use of other medications  CANCELED: POCT Urine Drug Screen Presump      3. Parkinson's disease              Plan:       She tried to do UDS but missed the cup.  Will send in one month supply and she can come back at [facility] and repeat the urine.   reviewed.

## 2023-03-24 ENCOUNTER — APPOINTMENT (OUTPATIENT)
Dept: LAB | Facility: CLINIC | Age: 83
End: 2023-03-24
Payer: MEDICARE

## 2023-03-24 DIAGNOSIS — Z79.899 ENCOUNTER FOR LONG-TERM (CURRENT) USE OF OTHER MEDICATIONS: Primary | ICD-10-CM

## 2023-03-24 PROCEDURE — 80305 DRUG TEST PRSMV DIR OPT OBS: CPT | Mod: RHCUB | Performed by: FAMILY MEDICINE

## 2023-05-09 DIAGNOSIS — Z71.89 COMPLEX CARE COORDINATION: ICD-10-CM

## 2023-06-18 PROBLEM — Z79.899 ENCOUNTER FOR LONG-TERM (CURRENT) USE OF OTHER MEDICATIONS: Status: ACTIVE | Noted: 2023-06-18

## 2023-06-18 PROBLEM — Z78.0 ENCOUNTER FOR OSTEOPOROSIS SCREENING IN ASYMPTOMATIC POSTMENOPAUSAL PATIENT: Status: ACTIVE | Noted: 2023-06-18

## 2023-06-18 PROBLEM — Z13.820 ENCOUNTER FOR OSTEOPOROSIS SCREENING IN ASYMPTOMATIC POSTMENOPAUSAL PATIENT: Status: ACTIVE | Noted: 2023-06-18

## 2023-06-19 ENCOUNTER — OFFICE VISIT (OUTPATIENT)
Dept: FAMILY MEDICINE | Facility: CLINIC | Age: 83
End: 2023-06-19
Payer: MEDICARE

## 2023-06-19 VITALS
BODY MASS INDEX: 26.63 KG/M2 | WEIGHT: 156 LBS | OXYGEN SATURATION: 94 % | DIASTOLIC BLOOD PRESSURE: 70 MMHG | SYSTOLIC BLOOD PRESSURE: 120 MMHG | HEART RATE: 100 BPM | HEIGHT: 64 IN | TEMPERATURE: 98 F | RESPIRATION RATE: 16 BRPM

## 2023-06-19 DIAGNOSIS — Z13.820 ENCOUNTER FOR OSTEOPOROSIS SCREENING IN ASYMPTOMATIC POSTMENOPAUSAL PATIENT: Chronic | ICD-10-CM

## 2023-06-19 DIAGNOSIS — Z79.899 ENCOUNTER FOR LONG-TERM (CURRENT) USE OF OTHER MEDICATIONS: Primary | Chronic | ICD-10-CM

## 2023-06-19 DIAGNOSIS — F51.01 PRIMARY INSOMNIA: Chronic | ICD-10-CM

## 2023-06-19 DIAGNOSIS — E03.9 ACQUIRED HYPOTHYROIDISM: Chronic | ICD-10-CM

## 2023-06-19 DIAGNOSIS — I10 ESSENTIAL HYPERTENSION, BENIGN: Chronic | ICD-10-CM

## 2023-06-19 DIAGNOSIS — Z78.0 ENCOUNTER FOR OSTEOPOROSIS SCREENING IN ASYMPTOMATIC POSTMENOPAUSAL PATIENT: Chronic | ICD-10-CM

## 2023-06-19 DIAGNOSIS — G20.A1 PARKINSON'S DISEASE: Chronic | ICD-10-CM

## 2023-06-19 LAB
ALBUMIN SERPL BCP-MCNC: 4 G/DL (ref 3.5–5)
ALBUMIN/GLOB SERPL: 1.1 {RATIO}
ALP SERPL-CCNC: 69 U/L (ref 55–142)
ALT SERPL W P-5'-P-CCNC: 12 U/L (ref 13–56)
ANION GAP SERPL CALCULATED.3IONS-SCNC: 11 MMOL/L (ref 7–16)
AST SERPL W P-5'-P-CCNC: 18 U/L (ref 15–37)
BASOPHILS # BLD AUTO: 0.06 K/UL (ref 0–0.2)
BASOPHILS NFR BLD AUTO: 0.7 % (ref 0–1)
BILIRUB SERPL-MCNC: 0.3 MG/DL (ref ?–1.2)
BUN SERPL-MCNC: 13 MG/DL (ref 7–18)
BUN/CREAT SERPL: 16 (ref 6–20)
CALCIUM SERPL-MCNC: 9.4 MG/DL (ref 8.5–10.1)
CHLORIDE SERPL-SCNC: 104 MMOL/L (ref 98–107)
CHOLEST SERPL-MCNC: 176 MG/DL (ref 0–200)
CHOLEST/HDLC SERPL: 1.9 {RATIO}
CO2 SERPL-SCNC: 29 MMOL/L (ref 21–32)
CREAT SERPL-MCNC: 0.79 MG/DL (ref 0.55–1.02)
DIFFERENTIAL METHOD BLD: ABNORMAL
EGFR (NO RACE VARIABLE) (RUSH/TITUS): 75 ML/MIN/1.73M2
EOSINOPHIL # BLD AUTO: 0.09 K/UL (ref 0–0.5)
EOSINOPHIL NFR BLD AUTO: 1.1 % (ref 1–4)
ERYTHROCYTE [DISTWIDTH] IN BLOOD BY AUTOMATED COUNT: 12.7 % (ref 11.5–14.5)
GLOBULIN SER-MCNC: 3.5 G/DL (ref 2–4)
GLUCOSE SERPL-MCNC: 82 MG/DL (ref 74–106)
HCT VFR BLD AUTO: 45.5 % (ref 38–47)
HDLC SERPL-MCNC: 95 MG/DL (ref 40–60)
HGB BLD-MCNC: 14.5 G/DL (ref 12–16)
IMM GRANULOCYTES # BLD AUTO: 0.02 K/UL (ref 0–0.04)
IMM GRANULOCYTES NFR BLD: 0.2 % (ref 0–0.4)
LDLC SERPL CALC-MCNC: 62 MG/DL
LYMPHOCYTES # BLD AUTO: 1.81 K/UL (ref 1–4.8)
LYMPHOCYTES NFR BLD AUTO: 22 % (ref 27–41)
MCH RBC QN AUTO: 28.8 PG (ref 27–31)
MCHC RBC AUTO-ENTMCNC: 31.9 G/DL (ref 32–36)
MCV RBC AUTO: 90.3 FL (ref 80–96)
MONOCYTES # BLD AUTO: 0.69 K/UL (ref 0–0.8)
MONOCYTES NFR BLD AUTO: 8.4 % (ref 2–6)
MPC BLD CALC-MCNC: 10.7 FL (ref 9.4–12.4)
NEUTROPHILS # BLD AUTO: 5.56 K/UL (ref 1.8–7.7)
NEUTROPHILS NFR BLD AUTO: 67.6 % (ref 53–65)
NONHDLC SERPL-MCNC: 81 MG/DL
NRBC # BLD AUTO: 0 X10E3/UL
NRBC, AUTO (.00): 0 %
PLATELET # BLD AUTO: 272 K/UL (ref 150–400)
POTASSIUM SERPL-SCNC: 4.3 MMOL/L (ref 3.5–5.1)
PROT SERPL-MCNC: 7.5 G/DL (ref 6.4–8.2)
RBC # BLD AUTO: 5.04 M/UL (ref 4.2–5.4)
SODIUM SERPL-SCNC: 140 MMOL/L (ref 136–145)
TRIGL SERPL-MCNC: 96 MG/DL (ref 35–150)
TSH SERPL DL<=0.005 MIU/L-ACNC: 0.64 UIU/ML (ref 0.36–3.74)
VLDLC SERPL-MCNC: 19 MG/DL
WBC # BLD AUTO: 8.23 K/UL (ref 4.5–11)

## 2023-06-19 PROCEDURE — 80061 LIPID PANEL: ICD-10-PCS | Mod: ,,, | Performed by: CLINICAL MEDICAL LABORATORY

## 2023-06-19 PROCEDURE — 84443 TSH: ICD-10-PCS | Mod: ,,, | Performed by: CLINICAL MEDICAL LABORATORY

## 2023-06-19 PROCEDURE — 99214 PR OFFICE/OUTPT VISIT, EST, LEVL IV, 30-39 MIN: ICD-10-PCS | Mod: ,,, | Performed by: NURSE PRACTITIONER

## 2023-06-19 PROCEDURE — 85025 CBC WITH DIFFERENTIAL: ICD-10-PCS | Mod: ,,, | Performed by: CLINICAL MEDICAL LABORATORY

## 2023-06-19 PROCEDURE — 84443 ASSAY THYROID STIM HORMONE: CPT | Mod: ,,, | Performed by: CLINICAL MEDICAL LABORATORY

## 2023-06-19 PROCEDURE — 80053 COMPREHENSIVE METABOLIC PANEL: ICD-10-PCS | Mod: ,,, | Performed by: CLINICAL MEDICAL LABORATORY

## 2023-06-19 PROCEDURE — 80061 LIPID PANEL: CPT | Mod: ,,, | Performed by: CLINICAL MEDICAL LABORATORY

## 2023-06-19 PROCEDURE — 80053 COMPREHEN METABOLIC PANEL: CPT | Mod: ,,, | Performed by: CLINICAL MEDICAL LABORATORY

## 2023-06-19 PROCEDURE — 85025 COMPLETE CBC W/AUTO DIFF WBC: CPT | Mod: ,,, | Performed by: CLINICAL MEDICAL LABORATORY

## 2023-06-19 PROCEDURE — 99214 OFFICE O/P EST MOD 30 MIN: CPT | Mod: ,,, | Performed by: NURSE PRACTITIONER

## 2023-06-20 LAB — VALPROATE SERPL-MCNC: 20 ΜG/ML (ref 50–100)

## 2023-06-20 NOTE — ASSESSMENT & PLAN NOTE
Discussed weaning Klonopin and changing to another medication that may treat insomnia better. States she will obtain medication from Dr. Grant with other antipsychotic therapy for treatment of Parkinsons.

## 2023-06-20 NOTE — PROGRESS NOTES
PATIENT NAME: Cinthia Abraham  : 1940  DATE: 23  MRN: [id number]          Reason for Visit / Chief Complaint: Follow-up and Medication Refill (3 month)       Update PCP  Update Chief Complaint         History of Present Illness / Problem Focused Workflow     Cinthia Abraham presents to the clinic with Follow-up and Medication Refill (3 month)     Ms. Abraham presents to establish care and obtain refills.      Review of Systems     @Review of Systems   Constitutional:  Negative for chills, fatigue and fever.   Psychiatric/Behavioral:  The patient is not nervous/anxious.      Medical / Social / Family History   History reviewed. No pertinent past medical history.    History reviewed. No pertinent surgical history.    Social History  Ms.  reports that she has never smoked. She has never used smokeless tobacco. She reports that she does not drink alcohol and does not use drugs.    Family History  Ms.'s family history is not on file.    Medications and Allergies     Medications  Outpatient Medications Marked as Taking for the 23 encounter (Office Visit) with EMILY Fam   Medication Sig Dispense Refill    aspirin (ECOTRIN) 81 MG EC tablet 1 tablet      biotin 10 mg Tab 1 tablet      C,E,zinc,copper 11-omega3s-lut (OCUVITE ADULT 50 PLUS) 250-5-1 mg Cap       carbidopa-levodopa  mg (SINEMET)  mg per tablet TAKE TWO TABLETS BY MOUTH THREE TIMES A DAY. 540 tablet 3    carvediloL (COREG) 12.5 MG tablet       cholecalciferol, vitamin D3, (CHOLECALCIFEROL, VIT D3,,BULK,) 1 million unit/gram Liqd as directed      clonazePAM (KLONOPIN) 0.5 MG tablet Take 1 tablet (0.5 mg total) by mouth 2 (two) times daily as needed for Anxiety. 60 tablet 0    cyanocobalamin 1,000 mcg/mL injection       desvenlafaxine succinate (PRISTIQ) 50 MG Tb24       deutetrabenazine (AUSTEDO) 6 mg Tab       divalproex (DEPAKOTE) 125 MG EC tablet       folic acid (FOLVITE) 1 MG tablet       gabapentin (NEURONTIN) 300 MG  capsule Take 300 mg by mouth 3 (three) times daily.      ibuprofen (ADVIL,MOTRIN) 600 MG tablet Take 1 tablet (600 mg total) by mouth every 6 (six) hours as needed for Pain. 40 tablet 3    levothyroxine (SYNTHROID) 100 MCG tablet TAKE 1 TABLET BY MOUTH BEFORE BREAKFAST. 90 tablet 3    lisinopriL (PRINIVIL,ZESTRIL) 5 MG tablet       lurasidone (LATUDA) 40 mg Tab tablet Take 40 mg by mouth once daily.      mirtazapine (REMERON) 30 MG tablet TAKE ONE TABLET BY MOUTH DAILY IN THE EVENING 90 tablet 3    rosuvastatin (CRESTOR) 5 MG tablet          Allergies  Review of patient's allergies indicates:  No Known Allergies    Physical Examination     Vitals:    06/19/23 1115   BP: 120/70   Pulse: 100   Resp: 16   Temp: 98.4 °F (36.9 °C)     Physical Exam  Constitutional:       Appearance: Normal appearance.   HENT:      Head: Normocephalic.   Cardiovascular:      Rate and Rhythm: Normal rate and regular rhythm.      Pulses: Normal pulses.      Heart sounds: Normal heart sounds.   Pulmonary:      Effort: Pulmonary effort is normal.      Breath sounds: Normal breath sounds.   Abdominal:      Palpations: Abdomen is soft.   Skin:     General: Skin is warm and dry.   Neurological:      Mental Status: She is alert. Mental status is at baseline.   Psychiatric:         Behavior: Behavior normal.             Lab Results   Component Value Date    WBC 8.23 06/19/2023    HGB 14.5 06/19/2023    HCT 45.5 06/19/2023    MCV 90.3 06/19/2023     06/19/2023          Sodium   Date Value Ref Range Status   06/19/2023 140 136 - 145 mmol/L Final     Potassium   Date Value Ref Range Status   06/19/2023 4.3 3.5 - 5.1 mmol/L Final     Chloride   Date Value Ref Range Status   06/19/2023 104 98 - 107 mmol/L Final     CO2   Date Value Ref Range Status   06/19/2023 29 21 - 32 mmol/L Final     Glucose   Date Value Ref Range Status   06/19/2023 82 74 - 106 mg/dL Final     BUN   Date Value Ref Range Status   06/19/2023 13 7 - 18 mg/dL Final      Creatinine   Date Value Ref Range Status   06/19/2023 0.79 0.55 - 1.02 mg/dL Final     Calcium   Date Value Ref Range Status   06/19/2023 9.4 8.5 - 10.1 mg/dL Final     Total Protein   Date Value Ref Range Status   06/19/2023 7.5 6.4 - 8.2 g/dL Final     Albumin   Date Value Ref Range Status   06/19/2023 4.0 3.5 - 5.0 g/dL Final     Bilirubin, Total   Date Value Ref Range Status   06/19/2023 0.3 >0.0 - 1.2 mg/dL Final     Alk Phos   Date Value Ref Range Status   06/19/2023 69 55 - 142 U/L Final     AST   Date Value Ref Range Status   06/19/2023 18 15 - 37 U/L Final     ALT   Date Value Ref Range Status   06/19/2023 12 (L) 13 - 56 U/L Final     Anion Gap   Date Value Ref Range Status   06/19/2023 11 7 - 16 mmol/L Final     eGFR   Date Value Ref Range Status   12/02/2021 59 (L) >=60 mL/min/1.73m² Final      Procedures   Assessment and Plan (including Health Maintenance)      Problem List  Smart Sets  Document Outside HM   :    Plan:     Problem List Items Addressed This Visit          Neuro    Parkinson's disease    Current Assessment & Plan     Followed by Dr. Grant in [place].   Keep scheduled appt in July 2023  Manages medications and treatments.            Cardiac/Vascular    Essential hypertension, benign    Current Assessment & Plan     BP reviewed and stable.   Continue current therapy.            Endocrine    Acquired hypothyroidism    Current Assessment & Plan     TSH   Continue Synthroid. Adjust if needed.         Relevant Orders    TSH (Completed)       Orthopedic    Encounter for osteoporosis screening in asymptomatic postmenopausal patient    Current Assessment & Plan     Dexa Scan         Relevant Orders    DXA Bone Density Axial Skeleton 1 or more sites       Other    Primary insomnia    Current Assessment & Plan     Discussed weaning Klonopin and changing to another medication that may treat insomnia better. States she will obtain medication from Dr. Grant with other antipsychotic therapy  for treatment of Parkinsons.          Encounter for long-term (current) use of other medications - Primary    Current Assessment & Plan     Routine lab work.   Will review and discuss.           Relevant Orders    Comprehensive Metabolic Panel (Completed)    CBC Auto Differential (Completed)    Lipid Panel (Completed)    TSH (Completed)    Valproic Acid       Health Maintenance Topics with due status: Not Due       Topic Last Completion Date    Lipid Panel 06/19/2023         Health Maintenance Due   Topic Date Due    TETANUS VACCINE  Never done    DEXA Scan  Never done    Shingles Vaccine (1 of 2) Never done        Tetanus/Shingles to obtain from local pharm.  Dexa ordered to be scheduled.    Signature:  Claudia Flores        Date of encounter: 6/19/23

## 2023-06-20 NOTE — ASSESSMENT & PLAN NOTE
Followed by Dr. Grant in [place].   Keep scheduled appt in July 2023  Manages medications and treatments.

## 2023-07-10 ENCOUNTER — HOSPITAL ENCOUNTER (OUTPATIENT)
Dept: RADIOLOGY | Facility: HOSPITAL | Age: 83
Discharge: HOME | End: 2023-07-10
Attending: NURSE PRACTITIONER
Payer: MEDICARE

## 2023-07-10 DIAGNOSIS — Z78.0 ENCOUNTER FOR OSTEOPOROSIS SCREENING IN ASYMPTOMATIC POSTMENOPAUSAL PATIENT: ICD-10-CM

## 2023-07-10 DIAGNOSIS — Z13.820 ENCOUNTER FOR OSTEOPOROSIS SCREENING IN ASYMPTOMATIC POSTMENOPAUSAL PATIENT: ICD-10-CM

## 2023-07-10 PROCEDURE — 77080 DXA BONE DENSITY AXIAL: CPT | Mod: TC

## 2023-07-10 PROCEDURE — 77080 DXA BONE DENSITY AXIAL: CPT | Mod: 26,,, | Performed by: RADIOLOGY

## 2023-07-10 PROCEDURE — 77080 DXA BONE DENSITY AXIAL SKELETON 1 OR MORE SITES: ICD-10-PCS | Mod: 26,,, | Performed by: RADIOLOGY

## 2023-09-11 ENCOUNTER — TELEPHONE (OUTPATIENT)
Dept: FAMILY MEDICINE | Facility: CLINIC | Age: 83
End: 2023-09-11

## 2023-09-18 PROBLEM — Z13.820 ENCOUNTER FOR OSTEOPOROSIS SCREENING IN ASYMPTOMATIC POSTMENOPAUSAL PATIENT: Status: RESOLVED | Noted: 2023-06-18 | Resolved: 2023-09-18

## 2023-09-18 PROBLEM — Z78.0 ENCOUNTER FOR OSTEOPOROSIS SCREENING IN ASYMPTOMATIC POSTMENOPAUSAL PATIENT: Status: RESOLVED | Noted: 2023-06-18 | Resolved: 2023-09-18

## 2023-12-09 DIAGNOSIS — Z71.89 COMPLEX CARE COORDINATION: ICD-10-CM

## 2024-01-09 RX ORDER — MIRTAZAPINE 30 MG/1
30 TABLET, FILM COATED ORAL NIGHTLY
Qty: 90 TABLET | Refills: 3 | Status: SHIPPED | OUTPATIENT
Start: 2024-01-09

## 2024-01-09 NOTE — TELEPHONE ENCOUNTER
Patient want her medicine sent to [facility] pharmacy with [employer] phone number is  ext [phone] thank you !

## 2024-01-10 RX ORDER — MIRTAZAPINE 30 MG/1
30 TABLET, FILM COATED ORAL NIGHTLY
Qty: 90 TABLET | Refills: 3 | OUTPATIENT
Start: 2024-01-10

## 2024-02-08 RX ORDER — LEVOTHYROXINE SODIUM 100 UG/1
100 TABLET ORAL
Qty: 90 TABLET | Refills: 3 | Status: SHIPPED | OUTPATIENT
Start: 2024-02-08

## 2024-04-16 NOTE — PROGRESS NOTES
"Subjective:       Patient ID: Cinthia Abraham is a 84 y.o. female.    Chief Complaint: Medication Refill    Ms. Abraham presents to clinic for 6 mos follow up and medication refills. She is requesting RX for gabapentin. Explains that she has been taking "her sister's RX for sleep and nerves." Long discussion on medical compliance and dangers of taking medications not prescribed due to drug interactions and side effects.     Active Problem List with Overview Notes    Diagnosis Date Noted    Other cardiomyopathies 04/17/2024    Encounter for long-term (current) use of other medications 06/18/2023    Encounter for medication monitoring 09/01/2022    Anxiety 06/05/2022    Primary insomnia 06/05/2022    Parkinson's disease 12/02/2021    Essential hypertension, benign 12/02/2021    Pacemaker 12/02/2021     Mj      Bilateral exudative age-related macular degeneration 12/02/2021    Primary osteoarthritis involving multiple joints 12/02/2021    Moderate episode of recurrent major depressive disorder 12/02/2021    Acquired hypothyroidism 12/02/2021        Review of Systems   Constitutional:  Negative for fatigue and fever.   HENT:  Negative for congestion, hearing loss, postnasal drip, rhinorrhea, sore throat, tinnitus and voice change.    Respiratory:  Negative for apnea, cough, choking, chest tightness and shortness of breath.    Cardiovascular:  Negative for chest pain, palpitations and leg swelling.   Gastrointestinal:  Negative for abdominal pain, constipation, diarrhea and nausea.   Genitourinary:  Negative for difficulty urinating.   Neurological:  Negative for dizziness, syncope, weakness and headaches.   Psychiatric/Behavioral:  Negative for sleep disturbance.         CBC:  Recent Labs   Lab 09/01/22  1021 06/19/23  1151 07/10/23  0920   WBC 8.54 8.23 8.60   RBC 4.74 5.04 4.87   Hemoglobin 14.0 14.5 14.0   Hematocrit 41.3 45.5 43.4   Platelet Count 275 272 289   MCV 87.1 90.3 89.1   MCH 29.5 28.8 28.7   MCHC " 33.9 31.9 L 32.3     CMP:  Recent Labs   Lab 09/01/22  1021 06/19/23  1151 07/10/23  0920   Glucose 84 82 100   Calcium 9.0 9.4 9.6   Albumin 4.0 4.0 3.7   Total Protein 7.4 7.5 6.8   Sodium 139 140 141   Potassium 4.1 4.3 4.2   CO2 28 29 30   Chloride 105 104 101   BUN 18 13 17   Creatinine 0.82 0.79 0.92   Alk Phos 82 69 70   ALT 9 L 12 L 6 L   AST 16 18 15   Bilirubin, Total 0.3 0.3 0.4     LIPIDS:  Recent Labs   Lab 07/10/23  0920   TSH 0.507   HDL Cholesterol 86 H   Cholesterol 173   Triglycerides 97   LDL Calculated 68   Cholesterol/HDL Ratio (Risk Factor) 2.0   Non-HDL 87     TSH:  Recent Labs   Lab 09/01/22  1021 06/19/23  1151 07/10/23  0920   TSH 1.240 0.642 0.507        Objective:      Vitals:    04/17/24 0952   BP: 117/79   Pulse: 64   Resp: 20   Temp: 97.5 °F (36.4 °C)      Physical Exam  Constitutional:       Appearance: Normal appearance. She is well-developed and normal weight.   HENT:      Head: Normocephalic.      Right Ear: External ear normal.      Left Ear: External ear normal.      Nose: Nose normal.      Mouth/Throat:      Lips: Pink.      Mouth: Mucous membranes are moist.      Pharynx: Oropharynx is clear.      Tonsils: No tonsillar exudate or tonsillar abscesses.   Eyes:      General: Lids are normal.      Pupils: Pupils are equal, round, and reactive to light.   Cardiovascular:      Rate and Rhythm: Normal rate and regular rhythm.      Pulses: Normal pulses.      Heart sounds: Normal heart sounds.   Pulmonary:      Effort: Pulmonary effort is normal.      Breath sounds: Normal breath sounds.   Abdominal:      Palpations: Abdomen is soft.   Musculoskeletal:         General: Normal range of motion.      Cervical back: Normal range of motion.   Skin:     General: Skin is warm and dry.   Neurological:      Mental Status: She is alert and oriented to person, place, and time.   Psychiatric:         Attention and Perception: Attention normal.         Mood and Affect: Mood normal.         Speech:  Speech normal.         Behavior: Behavior normal. Behavior is cooperative.       Assessment:       1. Encounter for long-term (current) use of other medications    2. Acquired hypothyroidism    3. Pacemaker    4. Moderate episode of recurrent major depressive disorder    5. Primary insomnia    6. Parkinson's disease without dyskinesia, unspecified whether manifestations fluctuate        Plan:   Will request labs from [facility] done last week.   Follow up in 6 mos or sooner if needed.  Patient will discuss with Blaire Smith in regards to taking Gabapentin with other medications or need for medication as antidepressant.     Problem List Items Addressed This Visit          Neuro    Parkinson's disease    Current Assessment & Plan     Followed by Dr. Webb in [place].  Manages medications and care plan.            Psychiatric    Moderate episode of recurrent major depressive disorder    Current Assessment & Plan     Followed by Blaire Smith for medication plan. Next appt 5/24/24  Remeron at             Cardiac/Vascular    Pacemaker    Overview     Mj         Current Assessment & Plan     Scheduled for battery change 4/24/24 by Dr. Carney at [facility].            Endocrine    Acquired hypothyroidism    Current Assessment & Plan     UTD refills.   Request labs from [facility]            Other    Primary insomnia    Encounter for long-term (current) use of other medications - Primary       Health Maintenance:  Health Maintenance Topics with due status: Not Due       Topic Last Completion Date    Lipid Panel 07/10/2023    DEXA Scan 07/10/2023           Claudia Flores   [facility]   4/17/24

## 2024-04-17 ENCOUNTER — OFFICE VISIT (OUTPATIENT)
Dept: FAMILY MEDICINE | Facility: CLINIC | Age: 84
End: 2024-04-17
Payer: MEDICARE

## 2024-04-17 VITALS
OXYGEN SATURATION: 94 % | TEMPERATURE: 98 F | SYSTOLIC BLOOD PRESSURE: 117 MMHG | DIASTOLIC BLOOD PRESSURE: 79 MMHG | HEART RATE: 64 BPM | RESPIRATION RATE: 20 BRPM | BODY MASS INDEX: 27.1 KG/M2 | WEIGHT: 157.88 LBS

## 2024-04-17 DIAGNOSIS — E03.9 ACQUIRED HYPOTHYROIDISM: ICD-10-CM

## 2024-04-17 DIAGNOSIS — G20.A1 PARKINSON'S DISEASE WITHOUT DYSKINESIA, UNSPECIFIED WHETHER MANIFESTATIONS FLUCTUATE: ICD-10-CM

## 2024-04-17 DIAGNOSIS — F33.1 MODERATE EPISODE OF RECURRENT MAJOR DEPRESSIVE DISORDER: ICD-10-CM

## 2024-04-17 DIAGNOSIS — F51.01 PRIMARY INSOMNIA: ICD-10-CM

## 2024-04-17 DIAGNOSIS — Z95.0 PACEMAKER: ICD-10-CM

## 2024-04-17 DIAGNOSIS — Z79.899 ENCOUNTER FOR LONG-TERM (CURRENT) USE OF OTHER MEDICATIONS: Primary | ICD-10-CM

## 2024-04-17 PROBLEM — I44.2 ATRIOVENTRICULAR BLOCK, COMPLETE: Status: ACTIVE | Noted: 2024-04-17

## 2024-04-17 PROBLEM — I42.8 OTHER CARDIOMYOPATHIES: Status: ACTIVE | Noted: 2024-04-17

## 2024-04-17 PROBLEM — I44.2 ATRIOVENTRICULAR BLOCK, COMPLETE: Status: RESOLVED | Noted: 2024-04-17 | Resolved: 2024-04-17

## 2024-04-17 PROCEDURE — 99213 OFFICE O/P EST LOW 20 MIN: CPT | Mod: ,,, | Performed by: NURSE PRACTITIONER

## 2024-04-17 NOTE — ASSESSMENT & PLAN NOTE
Scheduled for battery change 4/24/24 by Dr. Carney at [facility].

## 2024-07-09 DIAGNOSIS — Z71.89 COMPLEX CARE COORDINATION: ICD-10-CM

## 2024-10-23 ENCOUNTER — CLINICAL SUPPORT (OUTPATIENT)
Dept: FAMILY MEDICINE | Facility: CLINIC | Age: 84
End: 2024-10-23
Payer: MEDICARE

## 2024-10-23 DIAGNOSIS — Z23 IMMUNIZATION, PNEUMOCOCCUS AND INFLUENZA: Primary | ICD-10-CM

## 2024-10-23 NOTE — PROGRESS NOTES
Patient in for flu and pneumonia vaccine.hi-dose flu given in left deltoid.prevnar 20 given in right deltoid.tolerated well

## 2024-12-30 DIAGNOSIS — F33.1 MODERATE EPISODE OF RECURRENT MAJOR DEPRESSIVE DISORDER: Primary | ICD-10-CM

## 2024-12-30 RX ORDER — MIRTAZAPINE 30 MG/1
30 TABLET, FILM COATED ORAL NIGHTLY
Qty: 90 TABLET | Refills: 3 | Status: SHIPPED | OUTPATIENT
Start: 2024-12-30 | End: 2024-12-31 | Stop reason: SDUPTHER

## 2024-12-31 DIAGNOSIS — F33.1 MODERATE EPISODE OF RECURRENT MAJOR DEPRESSIVE DISORDER: ICD-10-CM

## 2024-12-31 RX ORDER — MIRTAZAPINE 30 MG/1
30 TABLET, FILM COATED ORAL NIGHTLY
Qty: 90 TABLET | Refills: 0 | Status: SHIPPED | OUTPATIENT
Start: 2024-12-31

## 2025-03-03 RX ORDER — OXYBUTYNIN CHLORIDE 5 MG/1
5 TABLET ORAL NIGHTLY PRN
Qty: 30 TABLET | Refills: 0 | Status: CANCELLED | OUTPATIENT
Start: 2025-03-03 | End: 2026-03-03

## 2025-03-17 DIAGNOSIS — F33.1 MODERATE EPISODE OF RECURRENT MAJOR DEPRESSIVE DISORDER: ICD-10-CM

## 2025-03-17 RX ORDER — MIRTAZAPINE 30 MG/1
30 TABLET, FILM COATED ORAL NIGHTLY
Qty: 90 TABLET | Refills: 0 | OUTPATIENT
Start: 2025-03-17

## 2025-03-20 ENCOUNTER — RESULTS FOLLOW-UP (OUTPATIENT)
Dept: FAMILY MEDICINE | Facility: CLINIC | Age: 85
End: 2025-03-20
Payer: MEDICARE

## 2025-03-20 ENCOUNTER — OFFICE VISIT (OUTPATIENT)
Dept: FAMILY MEDICINE | Facility: CLINIC | Age: 85
End: 2025-03-20
Payer: MEDICARE

## 2025-03-20 ENCOUNTER — TELEPHONE (OUTPATIENT)
Dept: FAMILY MEDICINE | Facility: CLINIC | Age: 85
End: 2025-03-20
Payer: MEDICARE

## 2025-03-20 VITALS
BODY MASS INDEX: 28.17 KG/M2 | TEMPERATURE: 98 F | SYSTOLIC BLOOD PRESSURE: 115 MMHG | DIASTOLIC BLOOD PRESSURE: 73 MMHG | WEIGHT: 165 LBS | OXYGEN SATURATION: 95 % | HEIGHT: 64 IN | HEART RATE: 71 BPM

## 2025-03-20 DIAGNOSIS — E03.9 ACQUIRED HYPOTHYROIDISM: ICD-10-CM

## 2025-03-20 DIAGNOSIS — Z86.79 HISTORY OF COMPLETE AV BLOCK: ICD-10-CM

## 2025-03-20 DIAGNOSIS — I10 ESSENTIAL HYPERTENSION, BENIGN: ICD-10-CM

## 2025-03-20 DIAGNOSIS — N39.45 CONTINUOUS LEAKAGE OF URINE: ICD-10-CM

## 2025-03-20 DIAGNOSIS — G20.A1 PARKINSON'S DISEASE, UNSPECIFIED WHETHER DYSKINESIA PRESENT, UNSPECIFIED WHETHER MANIFESTATIONS FLUCTUATE: ICD-10-CM

## 2025-03-20 DIAGNOSIS — F33.1 MODERATE EPISODE OF RECURRENT MAJOR DEPRESSIVE DISORDER: ICD-10-CM

## 2025-03-20 DIAGNOSIS — F51.01 PRIMARY INSOMNIA: ICD-10-CM

## 2025-03-20 DIAGNOSIS — H35.3230 BILATERAL EXUDATIVE AGE-RELATED MACULAR DEGENERATION, UNSPECIFIED STAGE: ICD-10-CM

## 2025-03-20 DIAGNOSIS — I50.42 CHRONIC COMBINED SYSTOLIC (CONGESTIVE) AND DIASTOLIC (CONGESTIVE) HEART FAILURE: ICD-10-CM

## 2025-03-20 DIAGNOSIS — F31.9 BIPOLAR AFFECTIVE DISORDER, REMISSION STATUS UNSPECIFIED: Primary | ICD-10-CM

## 2025-03-20 LAB
ALBUMIN SERPL BCP-MCNC: 4.2 G/DL (ref 3.4–4.8)
ALBUMIN/GLOB SERPL: 1.3 {RATIO}
ALP SERPL-CCNC: 72 U/L (ref 40–150)
ALT SERPL W P-5'-P-CCNC: <7 U/L
ANION GAP SERPL CALCULATED.3IONS-SCNC: 17 MMOL/L (ref 7–16)
AST SERPL W P-5'-P-CCNC: 16 U/L (ref 11–45)
BASOPHILS # BLD AUTO: 0.06 K/UL (ref 0–0.2)
BASOPHILS NFR BLD AUTO: 0.7 % (ref 0–1)
BILIRUB SERPL-MCNC: 0.3 MG/DL
BUN SERPL-MCNC: 15 MG/DL (ref 10–20)
BUN/CREAT SERPL: 17 (ref 6–20)
CALCIUM SERPL-MCNC: 9.3 MG/DL (ref 8.4–10.2)
CHLORIDE SERPL-SCNC: 103 MMOL/L (ref 98–107)
CHOLEST SERPL-MCNC: 194 MG/DL
CHOLEST/HDLC SERPL: 2 {RATIO}
CO2 SERPL-SCNC: 24 MMOL/L (ref 23–31)
CREAT SERPL-MCNC: 0.88 MG/DL (ref 0.55–1.02)
DIFFERENTIAL METHOD BLD: ABNORMAL
EGFR (NO RACE VARIABLE) (RUSH/TITUS): 65 ML/MIN/1.73M2
EOSINOPHIL # BLD AUTO: 0.07 K/UL (ref 0–0.5)
EOSINOPHIL NFR BLD AUTO: 0.9 % (ref 1–4)
ERYTHROCYTE [DISTWIDTH] IN BLOOD BY AUTOMATED COUNT: 12.7 % (ref 11.5–14.5)
GLOBULIN SER-MCNC: 3.2 G/DL (ref 2–4)
GLUCOSE SERPL-MCNC: 89 MG/DL (ref 82–115)
HCT VFR BLD AUTO: 46 % (ref 38–47)
HDLC SERPL-MCNC: 95 MG/DL (ref 35–60)
HGB BLD-MCNC: 14.8 G/DL (ref 12–16)
IMM GRANULOCYTES # BLD AUTO: 0.02 K/UL (ref 0–0.04)
IMM GRANULOCYTES NFR BLD: 0.2 % (ref 0–0.4)
LDLC SERPL CALC-MCNC: 76 MG/DL
LYMPHOCYTES # BLD AUTO: 1.36 K/UL (ref 1–4.8)
LYMPHOCYTES NFR BLD AUTO: 16.6 % (ref 27–41)
MCH RBC QN AUTO: 28.8 PG (ref 27–31)
MCHC RBC AUTO-ENTMCNC: 32.2 G/DL (ref 32–36)
MCV RBC AUTO: 89.7 FL (ref 80–96)
MONOCYTES # BLD AUTO: 0.7 K/UL (ref 0–0.8)
MONOCYTES NFR BLD AUTO: 8.6 % (ref 2–6)
MPC BLD CALC-MCNC: 11.1 FL (ref 9.4–12.4)
NEUTROPHILS # BLD AUTO: 5.97 K/UL (ref 1.8–7.7)
NEUTROPHILS NFR BLD AUTO: 73 % (ref 53–65)
NONHDLC SERPL-MCNC: 99 MG/DL
NRBC # BLD AUTO: 0 X10E3/UL
NRBC, AUTO (.00): 0 %
PLATELET # BLD AUTO: 264 K/UL (ref 150–400)
POTASSIUM SERPL-SCNC: 4.4 MMOL/L (ref 3.5–5.1)
PROT SERPL-MCNC: 7.4 G/DL (ref 5.8–7.6)
RBC # BLD AUTO: 5.13 M/UL (ref 4.2–5.4)
SODIUM SERPL-SCNC: 140 MMOL/L (ref 136–145)
T4 FREE SERPL-MCNC: 1.2 NG/DL (ref 0.7–1.48)
TRIGL SERPL-MCNC: 116 MG/DL (ref 37–140)
TSH SERPL DL<=0.005 MIU/L-ACNC: 2.73 UIU/ML (ref 0.35–4.94)
VLDLC SERPL-MCNC: 23 MG/DL
WBC # BLD AUTO: 8.18 K/UL (ref 4.5–11)

## 2025-03-20 PROCEDURE — 84443 ASSAY THYROID STIM HORMONE: CPT | Mod: ,,, | Performed by: CLINICAL MEDICAL LABORATORY

## 2025-03-20 PROCEDURE — 85025 COMPLETE CBC W/AUTO DIFF WBC: CPT | Mod: ,,, | Performed by: CLINICAL MEDICAL LABORATORY

## 2025-03-20 PROCEDURE — 99214 OFFICE O/P EST MOD 30 MIN: CPT | Mod: ,,, | Performed by: NURSE PRACTITIONER

## 2025-03-20 PROCEDURE — 80053 COMPREHEN METABOLIC PANEL: CPT | Mod: ,,, | Performed by: CLINICAL MEDICAL LABORATORY

## 2025-03-20 PROCEDURE — 80061 LIPID PANEL: CPT | Mod: ,,, | Performed by: CLINICAL MEDICAL LABORATORY

## 2025-03-20 PROCEDURE — 84439 ASSAY OF FREE THYROXINE: CPT | Mod: ,,, | Performed by: CLINICAL MEDICAL LABORATORY

## 2025-03-20 RX ORDER — OXYBUTYNIN CHLORIDE 5 MG/1
5 TABLET, EXTENDED RELEASE ORAL DAILY
Qty: 30 TABLET | Refills: 11 | Status: SHIPPED | OUTPATIENT
Start: 2025-03-20 | End: 2026-03-20

## 2025-03-20 RX ORDER — MIRTAZAPINE 30 MG/1
30 TABLET, FILM COATED ORAL NIGHTLY
Qty: 90 TABLET | Refills: 1 | Status: SHIPPED | OUTPATIENT
Start: 2025-03-20

## 2025-03-20 RX ORDER — LEVOTHYROXINE SODIUM 100 UG/1
100 TABLET ORAL
Qty: 90 TABLET | Refills: 1 | Status: SHIPPED | OUTPATIENT
Start: 2025-03-20

## 2025-03-20 RX ORDER — LEVOTHYROXINE SODIUM 100 UG/1
100 TABLET ORAL
Qty: 90 TABLET | Refills: 1 | Status: SHIPPED | OUTPATIENT
Start: 2025-03-20 | End: 2025-03-20

## 2025-03-20 RX ORDER — MIRTAZAPINE 30 MG/1
30 TABLET, FILM COATED ORAL NIGHTLY
Qty: 90 TABLET | Refills: 1 | Status: SHIPPED | OUTPATIENT
Start: 2025-03-20 | End: 2025-03-20

## 2025-03-20 RX ORDER — OXYBUTYNIN CHLORIDE 5 MG/1
5 TABLET, EXTENDED RELEASE ORAL DAILY
Qty: 30 TABLET | Refills: 11 | Status: SHIPPED | OUTPATIENT
Start: 2025-03-20 | End: 2025-03-20

## 2025-03-20 RX ORDER — GABAPENTIN 100 MG/1
100 CAPSULE ORAL
COMMUNITY

## 2025-03-20 NOTE — TELEPHONE ENCOUNTER
Copied from CRM #[id number]. Topic: Medications - Medication Question  >> Mar 20, 2025  1:04 PM Med Assistant Amy wrote:  Who Called: Cinthia Abraham    Caller is requesting assistance/information from provider's office.      Preferred Method of Contact: Phone Call  Patient's Preferred Phone Number on File: [phone]   Best Call Back Number, if different:  Additional Information: pharmacy telling pt that they didn't get prescription for overactive bladder

## 2025-03-24 PROBLEM — I10 ESSENTIAL HYPERTENSION, BENIGN: Chronic | Status: ACTIVE | Noted: 2021-12-02

## 2025-03-24 PROBLEM — F33.1 MODERATE EPISODE OF RECURRENT MAJOR DEPRESSIVE DISORDER: Chronic | Status: ACTIVE | Noted: 2021-12-02

## 2025-03-24 PROBLEM — H35.3230 BILATERAL EXUDATIVE AGE-RELATED MACULAR DEGENERATION: Chronic | Status: ACTIVE | Noted: 2021-12-02

## 2025-03-24 PROBLEM — E03.9 ACQUIRED HYPOTHYROIDISM: Chronic | Status: ACTIVE | Noted: 2021-12-02

## 2025-03-24 PROBLEM — I50.42 CHRONIC COMBINED SYSTOLIC (CONGESTIVE) AND DIASTOLIC (CONGESTIVE) HEART FAILURE: Chronic | Status: ACTIVE | Noted: 2025-03-20

## 2025-03-24 PROBLEM — F31.9 BIPOLAR AFFECTIVE DISORDER, REMISSION STATUS UNSPECIFIED: Chronic | Status: ACTIVE | Noted: 2025-03-20

## 2025-03-24 PROBLEM — F51.01 PRIMARY INSOMNIA: Chronic | Status: ACTIVE | Noted: 2022-06-05

## 2025-03-24 PROBLEM — G20.A1 PARKINSON'S DISEASE: Chronic | Status: ACTIVE | Noted: 2021-12-02

## 2025-03-24 NOTE — ASSESSMENT & PLAN NOTE
Followed by Blaire Smith for medication plan. - she is seen monthly  Chronic, controlled  RX per Mental health provider.

## 2025-03-24 NOTE — PROGRESS NOTES
Subjective:       Patient ID: Cinthia Abraham is a 85 y.o. female.    Chief Complaint: Urinary Frequency (TETANUS VACCINE Never done/Shingles Vaccine(1 of 2) Never done/RSV Vaccine (Age 60+ and Pregnant patients)(1 - 1-dose 75+ series) Never done )      Active Problem List with Overview Notes    Diagnosis Date Noted    Bipolar affective disorder, remission status unspecified 03/20/2025    Chronic combined systolic (congestive) and diastolic (congestive) heart failure 03/20/2025    History of complete AV block 03/20/2025    Other cardiomyopathies 04/17/2024    Encounter for long-term (current) use of other medications 06/18/2023    Encounter for medication monitoring 09/01/2022    Anxiety 06/05/2022    Primary insomnia 06/05/2022    Parkinson's disease 12/02/2021    Essential hypertension, benign 12/02/2021    Pacemaker 12/02/2021     Mj      Bilateral exudative age-related macular degeneration 12/02/2021    Primary osteoarthritis involving multiple joints 12/02/2021    Moderate episode of recurrent major depressive disorder 12/02/2021    Acquired hypothyroidism 12/02/2021        Review of Systems   Constitutional:  Negative for chills, fatigue and fever.   HENT:  Negative for congestion, hearing loss, postnasal drip, rhinorrhea, sore throat, tinnitus and voice change.    Respiratory:  Negative for apnea, cough, choking, chest tightness and shortness of breath.    Cardiovascular:  Negative for chest pain, palpitations and leg swelling.   Gastrointestinal:  Negative for abdominal pain, constipation, diarrhea and nausea.   Genitourinary:  Negative for difficulty urinating.   Neurological:  Negative for dizziness, syncope, weakness and headaches.   Psychiatric/Behavioral:  Negative for sleep disturbance.         CBC:  Recent Labs   Lab 06/19/23  1151 07/10/23  0920 03/20/25  1119   WBC 8.23 8.60 8.18   RBC 5.04 4.87 5.13   Hemoglobin 14.5 14.0 14.8   Hematocrit 45.5 43.4 46.0   Platelet Count 272 289 264   MCV  90.3 89.1 89.7   MCH 28.8 28.7 28.8   MCHC 31.9 L 32.3 32.2     CMP:  Recent Labs   Lab 06/19/23  1151 07/10/23  0920 03/20/25  1119   Glucose 82 100 89   Calcium 9.4 9.6 9.3   Albumin 4.0 3.7 4.2   Total Protein 7.5 6.8 7.4   Sodium 140 141 140   Potassium 4.3 4.2 4.4   CO2 29 30 24   Chloride 104 101 103   BUN 13 17 15   Creatinine 0.79 0.92 0.88   Alk Phos 69 70 72   ALT 12 L 6 L <7   AST 18 15 16   Bilirubin, Total 0.3 0.4 0.3     LIPIDS:  Recent Labs   Lab 03/20/25  1119   TSH 2.730   HDL Cholesterol 95 H   Cholesterol 194   Triglycerides 116   LDL Calculated 76   Cholesterol/HDL Ratio (Risk Factor) 2.0   Non-HDL 99     TSH:  Recent Labs   Lab 06/19/23  1151 07/10/23  0920 03/20/25  1119   TSH 0.642 0.507 2.730        Objective:      Vitals:    03/20/25 1038   BP: 115/73   Pulse: 71   Temp: 98 °F (36.7 °C)      Physical Exam  Constitutional:       Appearance: Normal appearance. She is well-developed and normal weight.   HENT:      Head: Normocephalic.      Right Ear: External ear normal.      Left Ear: External ear normal.      Nose: Nose normal.      Mouth/Throat:      Lips: Pink.      Mouth: Mucous membranes are moist.      Pharynx: Oropharynx is clear.   Eyes:      General: Lids are normal.      Pupils: Pupils are equal, round, and reactive to light.   Cardiovascular:      Rate and Rhythm: Normal rate and regular rhythm.      Pulses: Normal pulses.      Heart sounds: Normal heart sounds.   Pulmonary:      Effort: Pulmonary effort is normal.      Breath sounds: Normal breath sounds.   Abdominal:      Palpations: Abdomen is soft.   Musculoskeletal:         General: Normal range of motion.      Cervical back: Normal range of motion.   Skin:     General: Skin is warm and dry.   Neurological:      Mental Status: She is alert and oriented to person, place, and time.      Gait: Gait abnormal.   Psychiatric:         Attention and Perception: Attention normal.         Mood and Affect: Mood normal.         Speech:  Speech is delayed.         Behavior: Behavior normal. Behavior is cooperative.       Assessment:       1. Bipolar affective disorder, remission status unspecified    2. Chronic combined systolic (congestive) and diastolic (congestive) heart failure    3. History of complete AV block    4. Bilateral exudative age-related macular degeneration, unspecified stage    5. Moderate episode of recurrent major depressive disorder    6. Acquired hypothyroidism    7. Continuous leakage of urine    8. Essential hypertension, benign    9. Primary insomnia    10. Parkinson's disease, unspecified whether dyskinesia present, unspecified whether manifestations fluctuate        Plan:     Problem List Items Addressed This Visit          Neuro    Parkinson's disease (Chronic)    Current Assessment & Plan   Followed by Dr. Webb in [place].  Manages medications and care plan.  Discussed Ditropan possibly increasing symptoms of Parkinson's disease.   Patient to monitor and call office if bothersome.         Relevant Orders    Comprehensive Metabolic Panel (Completed)    CBC Auto Differential (Completed)       Psychiatric    Moderate episode of recurrent major depressive disorder (Chronic)    Current Assessment & Plan   Followed by Blaire Smith for medication plan. - she is seen monthly  Chronic, controlled  RX per Mental health provider.         Relevant Medications    mirtazapine (REMERON) 30 MG tablet    Bipolar affective disorder, remission status unspecified - Primary (Chronic)    Current Assessment & Plan   Followed by Blaire Smith.  Chronic, controlled.  Labs today.  Continue same medications.         Relevant Orders    Comprehensive Metabolic Panel (Completed)    CBC Auto Differential (Completed)       Ophtho    Bilateral exudative age-related macular degeneration (Chronic)    Current Assessment & Plan   Chronic, controlled  Confait  NO issues.   UTD medications.            Cardiac/Vascular    Essential hypertension, benign  (Chronic)    Current Assessment & Plan   BP reviewed and stable.   Continue current therapy.  /73         Relevant Orders    Lipid Panel (Completed)    Comprehensive Metabolic Panel (Completed)    CBC Auto Differential (Completed)    Chronic combined systolic (congestive) and diastolic (congestive) heart failure (Chronic)    Current Assessment & Plan   Carvedilol 12.5mg - UTD refill  Dr. Barker at [facility] follows.  /73         History of complete AV block    Current Assessment & Plan   Monitored by Dr. Barker  BB- carvedilol   PM            Endocrine    Acquired hypothyroidism (Chronic)    Current Assessment & Plan   Lsbs today  Refill Synthroid 100 mcg daily         Relevant Medications    levothyroxine (SYNTHROID) 100 MCG tablet    Other Relevant Orders    TSH (Completed)    T4, Free (Completed)       Other    Primary insomnia (Chronic)    Current Assessment & Plan   Chronic, controled with medications.            Other Visit Diagnoses         Continuous leakage of urine        Relevant Medications    oxybutynin (DITROPAN-XL) 5 MG TR24            Health Maintenance:  Health Maintenance Topics with due status: Not Due       Topic Last Completion Date    DEXA Scan 07/10/2023    Lipid Panel 03/20/2025           Claudia Flores   [facility]   3/20/25

## 2025-03-24 NOTE — ASSESSMENT & PLAN NOTE
Monitored by Dr. Barker  BB- carvedilol   PM   [Follow-Up: _____] : a [unfilled] follow-up visit [Language Line ] : provided by Language Line   [Interpreters_IDNumber] : [id number] [Interpreters_FullName] : Bjorn [[id number]] : American

## 2025-03-24 NOTE — ASSESSMENT & PLAN NOTE
Followed by Dr. Webb in [place].  Manages medications and care plan.  Discussed Ditropan possibly increasing symptoms of Parkinson's disease.   Patient to monitor and call office if bothersome.

## 2025-04-08 ENCOUNTER — TELEPHONE (OUTPATIENT)
Dept: FAMILY MEDICINE | Facility: CLINIC | Age: 85
End: 2025-04-08
Payer: MEDICARE

## 2025-04-08 DIAGNOSIS — N39.45 CONTINUOUS LEAKAGE OF URINE: ICD-10-CM

## 2025-04-08 RX ORDER — OXYBUTYNIN CHLORIDE 10 MG/1
10 TABLET, EXTENDED RELEASE ORAL DAILY
Qty: 30 TABLET | Refills: 11 | Status: SHIPPED | OUTPATIENT
Start: 2025-04-08 | End: 2026-04-08

## 2025-04-08 NOTE — TELEPHONE ENCOUNTER
----- Message from Florina sent at 4/8/2025 11:54 AM CDT -----  Regarding: ES  Patient would like to know if the medication for overactive bladder is supposed to be working already, patient has had no relief would like a call back.

## 2025-04-08 NOTE — TELEPHONE ENCOUNTER
She can take (2) of her current rx until complete. New RX Ditropan XL 10mg daily sent to [facility].

## 2025-08-14 ENCOUNTER — TELEPHONE (OUTPATIENT)
Dept: FAMILY MEDICINE | Facility: CLINIC | Age: 85
End: 2025-08-14
Payer: MEDICARE